# Patient Record
Sex: MALE | Race: WHITE | NOT HISPANIC OR LATINO | Employment: PART TIME | URBAN - METROPOLITAN AREA
[De-identification: names, ages, dates, MRNs, and addresses within clinical notes are randomized per-mention and may not be internally consistent; named-entity substitution may affect disease eponyms.]

---

## 2018-08-01 ENCOUNTER — APPOINTMENT (OUTPATIENT)
Dept: GENERAL RADIOLOGY | Facility: HOSPITAL | Age: 43
End: 2018-08-01

## 2018-08-01 ENCOUNTER — HOSPITAL ENCOUNTER (EMERGENCY)
Facility: HOSPITAL | Age: 43
Discharge: HOME | End: 2018-08-01
Attending: EMERGENCY MEDICINE | Admitting: EMERGENCY MEDICINE

## 2018-08-01 VITALS
SYSTOLIC BLOOD PRESSURE: 172 MMHG | HEIGHT: 67 IN | WEIGHT: 315 LBS | TEMPERATURE: 98.6 F | RESPIRATION RATE: 20 BRPM | DIASTOLIC BLOOD PRESSURE: 95 MMHG | HEART RATE: 64 BPM | BODY MASS INDEX: 49.44 KG/M2 | OXYGEN SATURATION: 99 %

## 2018-08-01 DIAGNOSIS — S63.92XA SPRAIN OF LEFT HAND, INITIAL ENCOUNTER: Primary | ICD-10-CM

## 2018-08-01 PROCEDURE — 73130 X-RAY EXAM OF HAND: CPT

## 2018-08-01 PROCEDURE — 99283 EMERGENCY DEPT VISIT LOW MDM: CPT

## 2018-08-01 PROCEDURE — 99282 EMERGENCY DEPT VISIT SF MDM: CPT | Performed by: PHYSICIAN ASSISTANT

## 2018-08-01 RX ORDER — IBUPROFEN 400 MG/1
800 TABLET ORAL ONCE
Status: COMPLETED | OUTPATIENT
Start: 2018-08-01 | End: 2018-08-01

## 2018-08-01 RX ADMIN — IBUPROFEN 800 MG: 400 TABLET ORAL at 21:35

## 2018-08-02 NOTE — ED PROVIDER NOTES
"Subjective   History of Present Illness  History of Present Illness    Chief complaint: hand injury    Location: L hand    Quality/Severity: \"pain\".  0/10 rest, 2-8 / 10 with movement (varies)    Timing/Duration: last night    Modifying Factors: movement and palp makes worse. Nothing makes better. Has not tried anything    Associated Symptoms: Denies numbness or tingling.  Denies change in sensation.  Denies wrist pain.    Narrative: 43-year-old male presents with a hand injury at work last night.  He states that he was moving some bins when an extra been was there that he was not aware of and his hand slipped and ended up doing \"the splits\" with his fingers.  He denies any other injury.    Review of Systems  General: Denies fevers or chills.  Denies any weakness or fatigue.  Denies any weight loss or weight gain.  SKIN: Denies any rashes lesions or ulcers.  Denies color change.  ENT: Denies sore throat or rhinorrhea.  Denies ear pain.    EYES: Denies any blurred vision.  Denies any change in vision.  Denies any photophobia.  Denies any vision loss.  LUNGS: Denies any shortness of breath or wheezing.  Denies any cough.  Denies any hemoptysis.  CARDIAC: Denies any chest pain.  Denies palpitations.  Denies syncope.  Denies any edema  ABD: Denies any abdominal pain.  Denies any nausea or vomiting or diarrhea.  Denies any rectal bleeding.  Denies constipation  : Denies any dysuria, urgency, frequency or hematuria.  Denies discharge.  Denies flank pain.  NEURO: Denies any focal weakness.  Denies headache.  Denies seizures.  Denies changes in speech or difficulty walking.  ENDOCRINE: Denies polydipsia and polyuria  M/S: as above.  Denies back pain, myalgias or neck pain  HEME/LYMPH: Negative for adenopathy. Does not bruise/bleed easily.   PSYCH: Negative for suicidal ideas. Denies anxiety or depression  review was performed in addition to those in the above all other reviews are negative.      Past Medical History: "   Diagnosis Date   • Hypertension    • Hypoglycemia        No Known Allergies    History reviewed. No pertinent surgical history.    History reviewed. No pertinent family history.    Social History     Social History   • Marital status: Single     Social History Main Topics   • Smoking status: Current Every Day Smoker     Packs/day: 1.00     Types: Cigarettes   • Drug use: Unknown     Other Topics Concern   • Not on file     No current facility-administered medications for this encounter.   No current outpatient prescriptions on file.        Objective   Physical Exam  Vitals:    08/01/18 2106   BP: 172/95   Pulse: 64   Resp: 20   Temp: 98.6 °F (37 °C)   SpO2: 99%       GENERAL: Alert and oriented ×4.  No apparent distress.  SKIN: Warm, pink and dry  HEENT: Atraumatic normocephalic  LUNGS: Clear to auscultation bilaterally without wheezes, rales or rhonchi  CARDIAC: Regular rate and rhythm.  S1 and S2.  No murmurs, rubs or gallops.  M/S: no deformity.  Decreased range of motion with flexion to the left third and fourth digits restricted by pain.  Minimal edema.  Minimal left hand edema.  There is no erythema.  No ecchymosis.  No abrasions.  No wrist tenderness.  No snuffbox tenderness.  PSYCH: Normal mood and affect    Procedures           ED Course    Ice applied.  Motrin given.    Reviewed hand xray. Independently viewed by me. Interpreted by me. Discussed with patient that final radiology read would be done tomorrow by the radiologist. nad    Pt refuses rx.    Ace placed by myself. + PMS post placement.    Pt has been counseled on elevated BP and instructed to f/u with PMD for repeat BP check.  Discussed pertinent labs and imaging findings with the patient/family.  Patient/Family voiced understanding of need to follow-up for recheck, further testing as needed.  Return to the emergency Department warnings were given.    F/u occ med.            MDM  Number of Diagnoses or Management Options  Sprain of left hand,  initial encounter: new and requires workup     Amount and/or Complexity of Data Reviewed  Tests in the radiology section of CPT®: ordered and reviewed  Tests in the medicine section of CPT®: reviewed and ordered  Independent visualization of images, tracings, or specimens: yes    Risk of Complications, Morbidity, and/or Mortality  Presenting problems: low  Diagnostic procedures: low  Management options: low    Patient Progress  Patient progress: improved        Final diagnoses:   Sprain of left hand, initial encounter       Dictated utilizing Dragon dictation       Corie Duarte PA-C  08/01/18 3522

## 2018-08-02 NOTE — ED NOTES
Educated pt on medications, home care, follow-up care, and reasons to return to ER. Patient verbalized understanding. Patient ambulatory from ER.     Nelida Rivera RN  08/01/18 3634

## 2021-07-05 ENCOUNTER — APPOINTMENT (OUTPATIENT)
Dept: GENERAL RADIOLOGY | Facility: HOSPITAL | Age: 46
End: 2021-07-05

## 2021-07-05 ENCOUNTER — HOSPITAL ENCOUNTER (OUTPATIENT)
Facility: HOSPITAL | Age: 46
Setting detail: OBSERVATION
Discharge: HOME | End: 2021-07-08
Attending: EMERGENCY MEDICINE | Admitting: HOSPITALIST

## 2021-07-05 DIAGNOSIS — Z72.0 TOBACCO ABUSE: ICD-10-CM

## 2021-07-05 DIAGNOSIS — E66.01 MORBID OBESITY WITH BMI OF 50.0-59.9, ADULT (HCC): ICD-10-CM

## 2021-07-05 DIAGNOSIS — R07.2 PRECORDIAL PAIN: Primary | ICD-10-CM

## 2021-07-05 DIAGNOSIS — N17.9 ACUTE RENAL FAILURE, UNSPECIFIED ACUTE RENAL FAILURE TYPE (HCC): ICD-10-CM

## 2021-07-05 LAB
ALBUMIN SERPL-MCNC: 4.7 G/DL (ref 3.5–5.2)
ALBUMIN/GLOB SERPL: 1.4 G/DL
ALP SERPL-CCNC: 63 U/L (ref 39–117)
ALT SERPL W P-5'-P-CCNC: 12 U/L (ref 1–41)
ANION GAP SERPL CALCULATED.3IONS-SCNC: 14.5 MMOL/L (ref 5–15)
APTT PPP: 26.8 SECONDS (ref 24.3–38.1)
AST SERPL-CCNC: 15 U/L (ref 1–40)
BASOPHILS # BLD AUTO: 0.03 10*3/MM3 (ref 0–0.2)
BASOPHILS NFR BLD AUTO: 0.3 % (ref 0–1.5)
BILIRUB SERPL-MCNC: 0.8 MG/DL (ref 0–1.2)
BUN SERPL-MCNC: 37 MG/DL (ref 6–20)
BUN/CREAT SERPL: 9.4 (ref 7–25)
CALCIUM SPEC-SCNC: 10.6 MG/DL (ref 8.6–10.5)
CHLORIDE SERPL-SCNC: 99 MMOL/L (ref 98–107)
CO2 SERPL-SCNC: 25.5 MMOL/L (ref 22–29)
CREAT SERPL-MCNC: 3.94 MG/DL (ref 0.76–1.27)
D DIMER PPP FEU-MCNC: 0.36 MCGFEU/ML (ref 0–0.46)
DEPRECATED RDW RBC AUTO: 47.6 FL (ref 37–54)
EOSINOPHIL # BLD AUTO: 0.06 10*3/MM3 (ref 0–0.4)
EOSINOPHIL NFR BLD AUTO: 0.5 % (ref 0.3–6.2)
ERYTHROCYTE [DISTWIDTH] IN BLOOD BY AUTOMATED COUNT: 14.3 % (ref 12.3–15.4)
GFR SERPL CREATININE-BSD FRML MDRD: 17 ML/MIN/1.73
GLOBULIN UR ELPH-MCNC: 3.3 GM/DL
GLUCOSE SERPL-MCNC: 141 MG/DL (ref 65–99)
HCT VFR BLD AUTO: 45.8 % (ref 37.5–51)
HGB BLD-MCNC: 14.5 G/DL (ref 13–17.7)
IMM GRANULOCYTES # BLD AUTO: 0.05 10*3/MM3 (ref 0–0.05)
IMM GRANULOCYTES NFR BLD AUTO: 0.5 % (ref 0–0.5)
INR PPP: 1.11 (ref 0.9–1.1)
LYMPHOCYTES # BLD AUTO: 1.24 10*3/MM3 (ref 0.7–3.1)
LYMPHOCYTES NFR BLD AUTO: 11.2 % (ref 19.6–45.3)
MCH RBC QN AUTO: 28.9 PG (ref 26.6–33)
MCHC RBC AUTO-ENTMCNC: 31.7 G/DL (ref 31.5–35.7)
MCV RBC AUTO: 91.4 FL (ref 79–97)
MONOCYTES # BLD AUTO: 0.86 10*3/MM3 (ref 0.1–0.9)
MONOCYTES NFR BLD AUTO: 7.8 % (ref 5–12)
NEUTROPHILS NFR BLD AUTO: 79.7 % (ref 42.7–76)
NEUTROPHILS NFR BLD AUTO: 8.85 10*3/MM3 (ref 1.7–7)
NRBC BLD AUTO-RTO: 0 /100 WBC (ref 0–0.2)
NT-PROBNP SERPL-MCNC: 268.1 PG/ML (ref 0–450)
PLATELET # BLD AUTO: 332 10*3/MM3 (ref 140–450)
PMV BLD AUTO: 9.8 FL (ref 6–12)
POTASSIUM SERPL-SCNC: 4.1 MMOL/L (ref 3.5–5.2)
PROT SERPL-MCNC: 8 G/DL (ref 6–8.5)
PROTHROMBIN TIME: 14.3 SECONDS (ref 12.1–15)
RBC # BLD AUTO: 5.01 10*6/MM3 (ref 4.14–5.8)
SARS-COV-2 RNA PNL SPEC NAA+PROBE: NOT DETECTED
SODIUM SERPL-SCNC: 139 MMOL/L (ref 136–145)
TROPONIN T SERPL-MCNC: <0.01 NG/ML (ref 0–0.03)
WBC # BLD AUTO: 11.09 10*3/MM3 (ref 3.4–10.8)

## 2021-07-05 PROCEDURE — 93010 ELECTROCARDIOGRAM REPORT: CPT | Performed by: INTERNAL MEDICINE

## 2021-07-05 PROCEDURE — 85730 THROMBOPLASTIN TIME PARTIAL: CPT | Performed by: EMERGENCY MEDICINE

## 2021-07-05 PROCEDURE — 71045 X-RAY EXAM CHEST 1 VIEW: CPT

## 2021-07-05 PROCEDURE — 80053 COMPREHEN METABOLIC PANEL: CPT | Performed by: EMERGENCY MEDICINE

## 2021-07-05 PROCEDURE — G0378 HOSPITAL OBSERVATION PER HR: HCPCS

## 2021-07-05 PROCEDURE — 73610 X-RAY EXAM OF ANKLE: CPT

## 2021-07-05 PROCEDURE — 83880 ASSAY OF NATRIURETIC PEPTIDE: CPT | Performed by: EMERGENCY MEDICINE

## 2021-07-05 PROCEDURE — 99284 EMERGENCY DEPT VISIT MOD MDM: CPT

## 2021-07-05 PROCEDURE — C9803 HOPD COVID-19 SPEC COLLECT: HCPCS

## 2021-07-05 PROCEDURE — 96360 HYDRATION IV INFUSION INIT: CPT

## 2021-07-05 PROCEDURE — 87635 SARS-COV-2 COVID-19 AMP PRB: CPT | Performed by: EMERGENCY MEDICINE

## 2021-07-05 PROCEDURE — 96361 HYDRATE IV INFUSION ADD-ON: CPT

## 2021-07-05 PROCEDURE — 93005 ELECTROCARDIOGRAM TRACING: CPT | Performed by: EMERGENCY MEDICINE

## 2021-07-05 PROCEDURE — 99285 EMERGENCY DEPT VISIT HI MDM: CPT | Performed by: EMERGENCY MEDICINE

## 2021-07-05 PROCEDURE — 85379 FIBRIN DEGRADATION QUANT: CPT | Performed by: EMERGENCY MEDICINE

## 2021-07-05 PROCEDURE — 85025 COMPLETE CBC W/AUTO DIFF WBC: CPT | Performed by: EMERGENCY MEDICINE

## 2021-07-05 PROCEDURE — 85610 PROTHROMBIN TIME: CPT | Performed by: EMERGENCY MEDICINE

## 2021-07-05 PROCEDURE — 94640 AIRWAY INHALATION TREATMENT: CPT

## 2021-07-05 PROCEDURE — 84484 ASSAY OF TROPONIN QUANT: CPT | Performed by: EMERGENCY MEDICINE

## 2021-07-05 RX ORDER — NITROGLYCERIN 0.4 MG/1
0.4 TABLET SUBLINGUAL
Status: DISCONTINUED | OUTPATIENT
Start: 2021-07-05 | End: 2021-07-08 | Stop reason: HOSPADM

## 2021-07-05 RX ORDER — SODIUM CHLORIDE 9 MG/ML
125 INJECTION, SOLUTION INTRAVENOUS CONTINUOUS
Status: DISCONTINUED | OUTPATIENT
Start: 2021-07-05 | End: 2021-07-08

## 2021-07-05 RX ORDER — ALBUTEROL SULFATE 2.5 MG/3ML
2.5 SOLUTION RESPIRATORY (INHALATION) ONCE
Status: COMPLETED | OUTPATIENT
Start: 2021-07-05 | End: 2021-07-05

## 2021-07-05 RX ORDER — SODIUM CHLORIDE 0.9 % (FLUSH) 0.9 %
10 SYRINGE (ML) INJECTION AS NEEDED
Status: DISCONTINUED | OUTPATIENT
Start: 2021-07-05 | End: 2021-07-08 | Stop reason: HOSPADM

## 2021-07-05 RX ORDER — ASPIRIN 81 MG/1
324 TABLET, CHEWABLE ORAL ONCE
Status: COMPLETED | OUTPATIENT
Start: 2021-07-05 | End: 2021-07-05

## 2021-07-05 RX ORDER — LISINOPRIL AND HYDROCHLOROTHIAZIDE 25; 20 MG/1; MG/1
1 TABLET ORAL DAILY
COMMUNITY
Start: 2021-05-26 | End: 2021-07-08 | Stop reason: HOSPADM

## 2021-07-05 RX ADMIN — SODIUM CHLORIDE 500 ML: 9 INJECTION, SOLUTION INTRAVENOUS at 19:07

## 2021-07-05 RX ADMIN — ASPIRIN 81 MG CHEWABLE TABLET 324 MG: 81 TABLET CHEWABLE at 17:50

## 2021-07-05 RX ADMIN — SODIUM CHLORIDE 125 ML/HR: 9 INJECTION, SOLUTION INTRAVENOUS at 19:45

## 2021-07-05 RX ADMIN — ALBUTEROL SULFATE 2.5 MG: 2.5 SOLUTION RESPIRATORY (INHALATION) at 18:15

## 2021-07-05 RX ADMIN — NITROGLYCERIN 0.4 MG: 0.4 TABLET SUBLINGUAL at 17:53

## 2021-07-05 RX ADMIN — SODIUM CHLORIDE 125 ML/HR: 9 INJECTION, SOLUTION INTRAVENOUS at 23:04

## 2021-07-05 NOTE — ED PROVIDER NOTES
"Subjective   Don Guajardo is a 46-year-old white male who presents secondary to chest pain.  Onset yesterday of left-sided chest pain.  Described as sharp.  The pain has been fairly constant although patient states he was able to go to sleep last night.  He was awakened several times during the night with pain.  Associated shortness of breath.  Approximate 1:30 PM patient felt tightness in his chest while on a break at work.  Patient's pain significantly worsened at that time.  He rates it at a 9.5 at its worst.  Pain is currently a 7.  Patient also reports that his \"vision narrowed\" at that time.  Patient presents for evaluation.    Cardiac risk factors include hypertension, morbid obesity and tobacco abuse.  Patient smokes cigarettes and small \"cigarette cigars\".      History provided by:  Patient      Review of Systems   Constitutional: Negative for fever.   HENT: Negative for rhinorrhea.    Eyes: Positive for visual disturbance. Negative for redness.   Respiratory: Positive for shortness of breath. Negative for cough.    Cardiovascular: Positive for chest pain.   Gastrointestinal: Negative for abdominal pain.   Genitourinary: Negative for dysuria.   Musculoskeletal: Negative for back pain.   Skin: Negative for rash.   Neurological: Negative for syncope.   All other systems reviewed and are negative.      Past Medical History:   Diagnosis Date   • Hypertension    • Hypoglycemia        No Known Allergies    History reviewed. No pertinent surgical history.    History reviewed. No pertinent family history.    Social History     Socioeconomic History   • Marital status: Single     Spouse name: Not on file   • Number of children: Not on file   • Years of education: Not on file   • Highest education level: Not on file   Tobacco Use   • Smoking status: Current Every Day Smoker     Packs/day: 2.00     Types: Cigarettes   • Smokeless tobacco: Never Used   Substance and Sexual Activity   • Alcohol use: Not Currently     " Comment: occasionally   • Drug use: Never           Objective   Physical Exam  Vitals and nursing note reviewed.   Constitutional:       General: He is in acute distress (Secondary to discomfort and anxiety.).      Appearance: Normal appearance. He is well-developed. He is not ill-appearing, toxic-appearing or diaphoretic.      Comments: 46-year-old white male laying in bed.  Patient is morbidly obese.  He appears in fair overall health.  Patient obviously anxious.  Tearful at times during history and physical.  Hyperventilating.  Vital signs notable for respiratory rate of 32.  Otherwise unremarkable.   HENT:      Head: Normocephalic and atraumatic.      Right Ear: Tympanic membrane, ear canal and external ear normal.      Left Ear: Tympanic membrane, ear canal and external ear normal.      Nose: Nose normal.      Mouth/Throat:      Mouth: Mucous membranes are moist.      Pharynx: Oropharynx is clear.   Eyes:      Extraocular Movements: Extraocular movements intact.      Conjunctiva/sclera: Conjunctivae normal.      Pupils: Pupils are equal, round, and reactive to light.   Cardiovascular:      Rate and Rhythm: Normal rate and regular rhythm.      Heart sounds: Normal heart sounds. No murmur heard.   No friction rub. No gallop.    Pulmonary:      Effort: Pulmonary effort is normal. Tachypnea present. No respiratory distress.      Breath sounds: No stridor. Wheezing (Slight expiratory wheeze heard in all lung fields.) present. No rhonchi or rales.   Abdominal:      General: There is no distension.      Palpations: Abdomen is soft.      Tenderness: There is no abdominal tenderness.   Musculoskeletal:         General: Normal range of motion.      Cervical back: Normal range of motion and neck supple.   Skin:     General: Skin is warm and dry.      Findings: No erythema or rash.   Neurological:      General: No focal deficit present.      Mental Status: He is alert and oriented to person, place, and time.      Cranial  Nerves: No cranial nerve deficit.      Deep Tendon Reflexes: Reflexes are normal and symmetric.   Psychiatric:         Mood and Affect: Mood is anxious.         Behavior: Behavior normal.         Procedures       EKG 12-lead  Date 7/5/2021  Time 17: 11  Normal sinus rhythm  Normal rate  Normal axis  Normal intervals  Q waves seen in inferior leads.  Not clinically significant.  Slight ST depression in inferior leads  Nonspecific T wave changes  Abnormal EKG    No old EKG for comparison.    ED Course  ED Course as of Jul 06 0020   Mon Jul 05, 2021   1725 Patient appears very anxious.  Hyperventilating.  EKG shows slight ST depression in inferior leads.  Small Q waves which are less than 1/3rd the height of the QRS complex.  Patient has multiple cardiac risk factors.  Giving aspirin and nitroglycerin.    [SS]   1813 Chest pain dropped from 7-4 with 1 nitroglycerin.  Unfortunately patient's blood pressure also dropped.  Will give a bolus of IV fluids.  With holding nitroglycerin at this time.    [SS]   1814 WBC(!): 11.09 [SS]   1814 Glucose(!): 141 [SS]   1814 BUN(!): 37 [SS]   1815 Creatinine(!): 3.94 [SS]   1815 CBC shows minimal leukocytosis with white count 11.09K.  Otherwise unremarkable.  CMP shows hyperglycemia glucose 141.  Acute renal failure with a BUN of 37 and creatinine 3.94.  Remainder of electrolytes are unremarkable including potassium which is 4.1.  Troponin, D-dimer and proBNP are all unremarkable.  Awaiting chest x-ray.  IV fluid bolus just ordered to secondary to decreased blood pressure after nitroglycerin.  Will start continuous infusion of IV fluids after bolus.    [SS]   1955 Discussed at length with patient all results, diagnoses, indications for hospitalization.  Patient reports he is chest pain-free except for deep inspiration.  Patient has no history of renal disease nor does anyone in his family.  As patient had normal renal function 1 month ago I suspect this is secondary to  dehydration.Discussed with Dr. Vahid Warner.  He will hospitalize for further care.  Consulting cardiology.    [SS]   1957 Discussed with Dr. Reuben Colvin.  She will evaluate patient in the morning and determine additional work-up at that time.    [SS]      ED Course User Index  [SS] Aden Dyson MD      Labs Reviewed   COMPREHENSIVE METABOLIC PANEL - Abnormal; Notable for the following components:       Result Value    Glucose 141 (*)     BUN 37 (*)     Creatinine 3.94 (*)     Calcium 10.6 (*)     eGFR Non  Amer 17 (*)     All other components within normal limits    Narrative:     GFR Normal >60  Chronic Kidney Disease <60  Kidney Failure <15     PROTIME-INR - Abnormal; Notable for the following components:    INR 1.11 (*)     All other components within normal limits    Narrative:     Therapeutic Ranges for INR: 2.0-3.0 (PT 20-30)                              2.5-3.5 (PT 25-34)   CBC WITH AUTO DIFFERENTIAL - Abnormal; Notable for the following components:    WBC 11.09 (*)     Neutrophil % 79.7 (*)     Lymphocyte % 11.2 (*)     Neutrophils, Absolute 8.85 (*)     All other components within normal limits   COVID-19,[facility] IN-HOUSE,[facility] SW[facility] NO TRANSPORT MEDIA 2 HR TAT - Normal    Narrative:     Fact sheet for providers: https://www.fda.gov/media/531699/download     Fact sheet for patients: https://www.fda.gov/media/554773/download    Test performed by PCR.    Consider negative results in combination with clinical observations, patient history, and epidemiological information.   APTT - Normal    Narrative:     PTT = The equivalent PTT values for the therapeutic range of heparin levels at 0.1 to 0.7 U/ml are 53 to 110 seconds.     BNP (IN-HOUSE) - Normal    Narrative:     Among patients with dyspnea, NT-proBNP is highly sensitive for the detection of acute congestive heart failure. In addition NT-proBNP of <300 pg/ml effectively rules out acute congestive heart failure with 99% negative predictive  value.    Results may be falsely decreased if patient taking Biotin.     D-DIMER, QUANTITATIVE - Normal    Narrative:     Can be elevated in, but is not diagnostic for deep vein thrombosis (DVT) or pulmonary embolis (PE).  It is also elevated in other medical conditions.  Clinical correlation is required.  The negative cut-off value for the D-Dimer is 0.50 mcg FEU/mL for DVT and PE.     TROPONIN (IN-HOUSE) - Normal    Narrative:     Troponin T Reference Range:  <= 0.03 ng/mL-   Negative for AMI  >0.03 ng/mL-     Abnormal for myocardial necrosis.  Clinicians would have to utilize clinical acumen, EKG, Troponin and serial changes to determine if it is an Acute Myocardial Infarction or myocardial injury due to an underlying chronic condition.       Results may be falsely decreased if patient taking Biotin.     TROPONIN (IN-HOUSE)   COMPREHENSIVE METABOLIC PANEL   CBC WITH AUTO DIFFERENTIAL   CBC AND DIFFERENTIAL    Narrative:     The following orders were created for panel order CBC & Differential.  Procedure                               Abnormality         Status                     ---------                               -----------         ------                     CBC Auto Differential[[id number]]         Abnormal            Final result                 Please view results for these tests on the individual orders.   CBC AND DIFFERENTIAL    Narrative:     The following orders were created for panel order CBC & Differential.  Procedure                               Abnormality         Status                     ---------                               -----------         ------                     CBC Auto Differential[[id number]]                                                         Please view results for these tests on the individual orders.     XR Ankle 3+ View Right    Result Date: 7/5/2021  Narrative: CR Ankle Min 3 Vws RT INDICATION: Chronic ankle pain COMPARISON: None. FINDINGS: 3 view(s) of the right ankle.   No fracture or dislocation. No bone erosion or destruction. There is degenerative change with a plantar spur. There also appears to be osseous abnormality in region of the posterior process of the talus that appears chronic and may be contributing to posterior impingement of the ankle. Degenerative changes are seen involving the midfoot. No foreign body.     Impression: No acute fracture or subluxation. No acute findings. There is degenerative change. Signer Name: Geena Calzada MD  Signed: 7/5/2021 6:43 PM  Workstation Name: [id number]  [facility]    XR Chest 1 View    Result Date: 7/5/2021  Narrative: CR Chest 1 Vw INDICATION: Chest pain and shortness of breath COMPARISON:  None available. FINDINGS: Single portable AP view(s) of the chest. Limited exam. The heart and mediastinal contours are normal. The lungs are clear. No pneumothorax or pleural effusion.     Impression: No definite acute cardiopulmonary findings. Signer Name: Geena Calzada MD  Signed: 7/5/2021 6:45 PM  Workstation Name: [id number]  [facility]    My differential diagnosis for chest pain includes but is not limited to:  Muscle strain, costochondritis, myositis, pleurisy, rib fracture, intercostal neuritis, herpes zoster, tumor, myocardial infarction, coronary syndrome, unstable angina, angina, aortic dissection, mitral valve prolapse, pericarditis, palpitations, pulmonary embolus, pneumonia, pneumothorax, lung cancer, GERD, esophagitis, esophageal spasm                                       MDM    Final diagnoses:   Precordial pain   Acute renal failure, unspecified acute renal failure type (CMS/[facility])   Tobacco abuse   Morbid obesity with BMI of 50.0-59.9, adult (CMS/[facility])       ED Disposition  ED Disposition     ED Disposition Condition Comment    Decision to Admit  Level of Care: Telemetry [5]   Admitting Physician: MIREYA ACHARYA [[id number]]   Attending Physician: MIREYA ACHARYA [[id number]]    Patient Class: Observation [104]            No follow-up provider specified.       Medication List      No changes were made to your prescriptions during this visit.          Aden Dyson MD  07/06/21 0020

## 2021-07-05 NOTE — ED NOTES
"Pt states he is currently chest pain free. He does c/o, \"a small pain\" when taking a deep breath but states it is not every time that he takes a deep breath.      Reshma Joshi RN  07/05/21 1936    "

## 2021-07-05 NOTE — ED NOTES
Pt states he is feeling better/ now slightly hypotensive, to hold ntg     Leonard Sutton, RN  07/05/21 2806

## 2021-07-06 ENCOUNTER — APPOINTMENT (OUTPATIENT)
Dept: NUCLEAR MEDICINE | Facility: HOSPITAL | Age: 46
End: 2021-07-06

## 2021-07-06 ENCOUNTER — APPOINTMENT (OUTPATIENT)
Dept: CARDIOLOGY | Facility: HOSPITAL | Age: 46
End: 2021-07-06

## 2021-07-06 LAB
ALBUMIN SERPL-MCNC: 3.9 G/DL (ref 3.5–5.2)
ALBUMIN/GLOB SERPL: 1.4 G/DL
ALP SERPL-CCNC: 53 U/L (ref 39–117)
ALT SERPL W P-5'-P-CCNC: 13 U/L (ref 1–41)
ANION GAP SERPL CALCULATED.3IONS-SCNC: 10.5 MMOL/L (ref 5–15)
AST SERPL-CCNC: 19 U/L (ref 1–40)
BASOPHILS # BLD AUTO: 0.03 10*3/MM3 (ref 0–0.2)
BASOPHILS NFR BLD AUTO: 0.4 % (ref 0–1.5)
BH CV ECHO MEAS - AO MAX PG: 8 MMHG
BH CV ECHO MEAS - AO MEAN PG (FULL): 2 MMHG
BH CV ECHO MEAS - AO MEAN PG: 5 MMHG
BH CV ECHO MEAS - AO V2 MAX: 145 CM/SEC
BH CV ECHO MEAS - AO V2 MEAN: 100 CM/SEC
BH CV ECHO MEAS - AO V2 VTI: 31.2 CM
BH CV ECHO MEAS - ASC AORTA: 3.2 CM
BH CV ECHO MEAS - AVA(I,A): 2.5 CM^2
BH CV ECHO MEAS - AVA(I,D): 2.5 CM^2
BH CV ECHO MEAS - BSA(HAYCOCK): 2.8 M^2
BH CV ECHO MEAS - BSA: 2.6 M^2
BH CV ECHO MEAS - BZI_BMI: 55.4 KILOGRAMS/M^2
BH CV ECHO MEAS - BZI_METRIC_HEIGHT: 170 CM
BH CV ECHO MEAS - BZI_METRIC_WEIGHT: 160 KG
BH CV ECHO MEAS - EDV(CUBED): 99.9 ML
BH CV ECHO MEAS - EDV(MOD-SP2): 103 ML
BH CV ECHO MEAS - EDV(MOD-SP4): 67.2 ML
BH CV ECHO MEAS - EDV(TEICH): 99.3 ML
BH CV ECHO MEAS - EF(CUBED): 63 %
BH CV ECHO MEAS - EF(MOD-SP2): 55.1 %
BH CV ECHO MEAS - EF(MOD-SP4): 55.7 %
BH CV ECHO MEAS - EF(TEICH): 54.6 %
BH CV ECHO MEAS - ESV(CUBED): 36.9 ML
BH CV ECHO MEAS - ESV(MOD-SP2): 46.2 ML
BH CV ECHO MEAS - ESV(MOD-SP4): 29.8 ML
BH CV ECHO MEAS - ESV(TEICH): 45.1 ML
BH CV ECHO MEAS - FS: 28.2 %
BH CV ECHO MEAS - IVS/LVPW: 1.1
BH CV ECHO MEAS - IVSD: 1 CM
BH CV ECHO MEAS - LAT PEAK E' VEL: 12.9 CM/SEC
BH CV ECHO MEAS - LV DIASTOLIC VOL/BSA (35-75): 26.1 ML/M^2
BH CV ECHO MEAS - LV MASS(C)D: 148.2 GRAMS
BH CV ECHO MEAS - LV MASS(C)DI: 57.6 GRAMS/M^2
BH CV ECHO MEAS - LV MAX PG: 5.6 MMHG
BH CV ECHO MEAS - LV MEAN PG: 3 MMHG
BH CV ECHO MEAS - LV SYSTOLIC VOL/BSA (12-30): 11.6 ML/M^2
BH CV ECHO MEAS - LV V1 MAX: 118 CM/SEC
BH CV ECHO MEAS - LV V1 MEAN: 78.1 CM/SEC
BH CV ECHO MEAS - LV V1 VTI: 22.3 CM
BH CV ECHO MEAS - LVIDD: 4.6 CM
BH CV ECHO MEAS - LVIDS: 3.3 CM
BH CV ECHO MEAS - LVLD AP2: 9 CM
BH CV ECHO MEAS - LVLD AP4: 7 CM
BH CV ECHO MEAS - LVLS AP2: 7.9 CM
BH CV ECHO MEAS - LVLS AP4: 6 CM
BH CV ECHO MEAS - LVOT AREA (M): 3.5 CM^2
BH CV ECHO MEAS - LVOT AREA: 3.5 CM^2
BH CV ECHO MEAS - LVOT DIAM: 2.1 CM
BH CV ECHO MEAS - LVPWD: 0.88 CM
BH CV ECHO MEAS - MED PEAK E' VEL: 11.1 CM/SEC
BH CV ECHO MEAS - MV A DUR: 203 SEC
BH CV ECHO MEAS - MV A MAX VEL: 59.2 CM/SEC
BH CV ECHO MEAS - MV DEC SLOPE: 289 CM/SEC^2
BH CV ECHO MEAS - MV DEC TIME: 185 SEC
BH CV ECHO MEAS - MV E MAX VEL: 100 CM/SEC
BH CV ECHO MEAS - MV E/A: 1.7
BH CV ECHO MEAS - MV MEAN PG: 2 MMHG
BH CV ECHO MEAS - MV P1/2T MAX VEL: 98.4 CM/SEC
BH CV ECHO MEAS - MV P1/2T: 99.7 MSEC
BH CV ECHO MEAS - MV V2 MEAN: 58.1 CM/SEC
BH CV ECHO MEAS - MV V2 VTI: 34.5 CM
BH CV ECHO MEAS - MVA P1/2T LCG: 2.2 CM^2
BH CV ECHO MEAS - MVA(P1/2T): 2.2 CM^2
BH CV ECHO MEAS - MVA(VTI): 2.2 CM^2
BH CV ECHO MEAS - PA ACC TIME: 0.14 SEC
BH CV ECHO MEAS - PA MAX PG: 4.1 MMHG
BH CV ECHO MEAS - PA PR(ACCEL): 16 MMHG
BH CV ECHO MEAS - PA V2 MAX: 101 CM/SEC
BH CV ECHO MEAS - RV MEAN PG: 1 MMHG
BH CV ECHO MEAS - RV V1 MEAN: 43 CM/SEC
BH CV ECHO MEAS - RV V1 VTI: 16.1 CM
BH CV ECHO MEAS - SI(CUBED): 24.5 ML/M^2
BH CV ECHO MEAS - SI(LVOT): 30 ML/M^2
BH CV ECHO MEAS - SI(MOD-SP2): 22.1 ML/M^2
BH CV ECHO MEAS - SI(MOD-SP4): 14.5 ML/M^2
BH CV ECHO MEAS - SI(TEICH): 21.1 ML/M^2
BH CV ECHO MEAS - SV(CUBED): 63 ML
BH CV ECHO MEAS - SV(LVOT): 77.2 ML
BH CV ECHO MEAS - SV(MOD-SP2): 56.8 ML
BH CV ECHO MEAS - SV(MOD-SP4): 37.4 ML
BH CV ECHO MEAS - SV(TEICH): 54.2 ML
BH CV ECHO MEASUREMENTS AVERAGE E/E' RATIO: 8.33
BILIRUB SERPL-MCNC: 0.5 MG/DL (ref 0–1.2)
BUN SERPL-MCNC: 45 MG/DL (ref 6–20)
BUN/CREAT SERPL: 13.5 (ref 7–25)
CALCIUM SPEC-SCNC: 9.7 MG/DL (ref 8.6–10.5)
CHLORIDE SERPL-SCNC: 103 MMOL/L (ref 98–107)
CO2 SERPL-SCNC: 27.5 MMOL/L (ref 22–29)
CREAT SERPL-MCNC: 3.33 MG/DL (ref 0.76–1.27)
DEPRECATED RDW RBC AUTO: 50 FL (ref 37–54)
EOSINOPHIL # BLD AUTO: 0.13 10*3/MM3 (ref 0–0.4)
EOSINOPHIL NFR BLD AUTO: 1.6 % (ref 0.3–6.2)
ERYTHROCYTE [DISTWIDTH] IN BLOOD BY AUTOMATED COUNT: 14.4 % (ref 12.3–15.4)
GFR SERPL CREATININE-BSD FRML MDRD: 20 ML/MIN/1.73
GLOBULIN UR ELPH-MCNC: 2.7 GM/DL
GLUCOSE SERPL-MCNC: 116 MG/DL (ref 65–99)
HCT VFR BLD AUTO: 40.9 % (ref 37.5–51)
HGB BLD-MCNC: 12.6 G/DL (ref 13–17.7)
IMM GRANULOCYTES # BLD AUTO: 0.04 10*3/MM3 (ref 0–0.05)
IMM GRANULOCYTES NFR BLD AUTO: 0.5 % (ref 0–0.5)
LV EF 2D ECHO EST: 55 %
LYMPHOCYTES # BLD AUTO: 2.01 10*3/MM3 (ref 0.7–3.1)
LYMPHOCYTES NFR BLD AUTO: 25.4 % (ref 19.6–45.3)
MAXIMAL PREDICTED HEART RATE: 176 BPM
MCH RBC QN AUTO: 29.4 PG (ref 26.6–33)
MCHC RBC AUTO-ENTMCNC: 30.8 G/DL (ref 31.5–35.7)
MCV RBC AUTO: 95.3 FL (ref 79–97)
MONOCYTES # BLD AUTO: 0.82 10*3/MM3 (ref 0.1–0.9)
MONOCYTES NFR BLD AUTO: 10.4 % (ref 5–12)
NEUTROPHILS NFR BLD AUTO: 4.88 10*3/MM3 (ref 1.7–7)
NEUTROPHILS NFR BLD AUTO: 61.7 % (ref 42.7–76)
NRBC BLD AUTO-RTO: 0 /100 WBC (ref 0–0.2)
PLATELET # BLD AUTO: 254 10*3/MM3 (ref 140–450)
PMV BLD AUTO: 10.1 FL (ref 6–12)
POTASSIUM SERPL-SCNC: 3.9 MMOL/L (ref 3.5–5.2)
PROT SERPL-MCNC: 6.6 G/DL (ref 6–8.5)
QT INTERVAL: 351 MS
QT INTERVAL: 411 MS
RBC # BLD AUTO: 4.29 10*6/MM3 (ref 4.14–5.8)
SODIUM SERPL-SCNC: 141 MMOL/L (ref 136–145)
STRESS TARGET HR: 150 BPM
TROPONIN T SERPL-MCNC: <0.01 NG/ML (ref 0–0.03)
WBC # BLD AUTO: 7.91 10*3/MM3 (ref 3.4–10.8)

## 2021-07-06 PROCEDURE — 84484 ASSAY OF TROPONIN QUANT: CPT | Performed by: INTERNAL MEDICINE

## 2021-07-06 PROCEDURE — 85025 COMPLETE CBC W/AUTO DIFF WBC: CPT | Performed by: INTERNAL MEDICINE

## 2021-07-06 PROCEDURE — 80050 GENERAL HEALTH PANEL: CPT | Performed by: INTERNAL MEDICINE

## 2021-07-06 PROCEDURE — 93016 CV STRESS TEST SUPVJ ONLY: CPT | Performed by: INTERNAL MEDICINE

## 2021-07-06 PROCEDURE — 96361 HYDRATE IV INFUSION ADD-ON: CPT

## 2021-07-06 PROCEDURE — 93306 TTE W/DOPPLER COMPLETE: CPT | Performed by: INTERNAL MEDICINE

## 2021-07-06 PROCEDURE — A9500 TC99M SESTAMIBI: HCPCS | Performed by: INTERNAL MEDICINE

## 2021-07-06 PROCEDURE — 99204 OFFICE O/P NEW MOD 45 MIN: CPT | Performed by: INTERNAL MEDICINE

## 2021-07-06 PROCEDURE — 93306 TTE W/DOPPLER COMPLETE: CPT

## 2021-07-06 PROCEDURE — G0378 HOSPITAL OBSERVATION PER HR: HCPCS

## 2021-07-06 PROCEDURE — 80053 COMPREHEN METABOLIC PANEL: CPT | Performed by: INTERNAL MEDICINE

## 2021-07-06 PROCEDURE — 93010 ELECTROCARDIOGRAM REPORT: CPT | Performed by: INTERNAL MEDICINE

## 2021-07-06 PROCEDURE — 99220 PR INITIAL OBSERVATION CARE/DAY 70 MINUTES: CPT | Performed by: INTERNAL MEDICINE

## 2021-07-06 PROCEDURE — 0 TECHNETIUM SESTAMIBI: Performed by: INTERNAL MEDICINE

## 2021-07-06 PROCEDURE — 25010000002 PERFLUTREN (DEFINITY) 8.476 MG IN SODIUM CHLORIDE (PF) 0.9 % 10 ML INJECTION: Performed by: INTERNAL MEDICINE

## 2021-07-06 PROCEDURE — 78452 HT MUSCLE IMAGE SPECT MULT: CPT

## 2021-07-06 PROCEDURE — 25010000002 REGADENOSON 0.4 MG/5ML SOLUTION: Performed by: INTERNAL MEDICINE

## 2021-07-06 PROCEDURE — 93018 CV STRESS TEST I&R ONLY: CPT | Performed by: INTERNAL MEDICINE

## 2021-07-06 PROCEDURE — 93005 ELECTROCARDIOGRAM TRACING: CPT | Performed by: INTERNAL MEDICINE

## 2021-07-06 PROCEDURE — 83036 HEMOGLOBIN GLYCOSYLATED A1C: CPT | Performed by: NURSE PRACTITIONER

## 2021-07-06 PROCEDURE — 78452 HT MUSCLE IMAGE SPECT MULT: CPT | Performed by: INTERNAL MEDICINE

## 2021-07-06 PROCEDURE — 93017 CV STRESS TEST TRACING ONLY: CPT

## 2021-07-06 RX ADMIN — TECHNETIUM TC 99M SESTAMIBI 1 DOSE: 1 INJECTION INTRAVENOUS at 12:42

## 2021-07-06 RX ADMIN — SODIUM CHLORIDE 125 ML/HR: 9 INJECTION, SOLUTION INTRAVENOUS at 20:22

## 2021-07-06 RX ADMIN — REGADENOSON 0.4 MG: 0.08 INJECTION, SOLUTION INTRAVENOUS at 12:42

## 2021-07-06 RX ADMIN — SODIUM CHLORIDE 2 ML: 9 INJECTION INTRAMUSCULAR; INTRAVENOUS; SUBCUTANEOUS at 08:33

## 2021-07-06 RX ADMIN — SODIUM CHLORIDE 125 ML/HR: 9 INJECTION, SOLUTION INTRAVENOUS at 06:23

## 2021-07-06 NOTE — H&P
Hospitalist Team H&P      Patient Care Team:  Deana Mcconnell MD as PCP - General (Internal Medicine)    CHIEF COMPLAINT: Chest pain    HISTORY OF PRESENT ILLNESS:    This 46-year-old gentleman, morbidly obese, who presented with atypical left-sided chest pain fairly constant, worsens with deep breathing, folic his chest was tight at work, presented to the emergency department ultimately with risk factors nonresolving intermittent chest pain, he was admitted for observation.  Chest pain going on for few days waxing and waning, no history of any abdominal pain or flank pain, no history of any UTIs or recent infections.  Troponins unremarkable there was findings of acute kidney injury with creatinine greater than 3.  He is an everyday smoker of up to 2 packs/day, minimal alcohol, he takes HCTZ lisinopril for hypertension only.  The labs and electrolytes appear to be okay other than BUN/creatinine.  LFTs appear to be okay, no sign of infection with normal white count not anemic.  No other complaints, no fever chills sweats nausea vomiting or diarrhea.    Review of systems otherwise negative x14 point review of systems except as mentioned above  Historical data copied forward from previous encounters in EMR is unchanged      Past Medical History:   Diagnosis Date   • Hypertension    • Hypoglycemia      History reviewed. No pertinent surgical history.  History reviewed. No pertinent family history.  Social History     Tobacco Use   • Smoking status: Current Every Day Smoker     Packs/day: 2.00     Types: Cigarettes   • Smokeless tobacco: Never Used   Substance Use Topics   • Alcohol use: Not Currently     Comment: occasionally   • Drug use: Never     Medications Prior to Admission   Medication Sig Dispense Refill Last Dose   • lisinopril-hydrochlorothiazide (PRINZIDE,ZESTORETIC) 20-25 MG per tablet Take 1 tablet by mouth Daily.   7/5/2021 at 1000     Allergies:  Patient has no known allergies.    REVIEW OF  "SYSTEMS:  Please see the above history of present illness for pertinent positives and negatives.  The remainder of the patient's systems have been reviewed and are negative.  Vital Signs  Temp:  [98.3 °F (36.8 °C)-98.7 °F (37.1 °C)] 98.7 °F (37.1 °C)  Heart Rate:  [67-95] 67  Resp:  [20-32] 20  BP: ()/(41-81) 105/63    Flowsheet Rows      First Filed Value   Admission Height  170.2 cm (67\") Documented at 07/05/2021 1711   Admission Weight  (!) 157 kg (347 lb) Documented at 07/05/2021 1711           Physical Exam:  Physical Exam   Constitutional: Patient appears well-developed and well-nourished and in no acute distress   HEENT:   Head: Normocephalic and atraumatic.   Eyes:  Pupils are equal, round, and reactive to light. EOM are intact. Sclerae are anicteric and noninjected.  Mouth and Throat: Patient has moist mucous membranes. Oropharynx is clear of any erythema or exudate.     Neck: Neck supple. No JVD present. No thyromegaly present. No lymphadenopathy present.  Cardiovascular: Regular rate, regular rhythm, S1 normal and S2 normal.  Exam reveals no gallop and no friction rub.  No murmur heard.  Pulmonary/Chest: Lungs are clear to auscultation bilaterally. No respiratory distress. No wheezes. No rhonchi. No rales.   Abdominal: Morbidly obese soft. Bowel sounds are normal. No distension and no mass. There is no hepatosplenomegaly. There is no tenderness.   Musculoskeletal: Normal muscle tone  Extremities: No edema. Pulses are palpable in all 4 extremities.  Neurological: Patient is alert and oriented to person, place, and time. Cranial nerves II-XII are grossly intact with no focal deficits.  Skin: Skin is warm. No rash noted. Nails show no clubbing.  No cyanosis or erythema.     Results Review:    I reviewed the patient's new clinical results.  Lab Results (most recent)     Procedure Component Value Units Date/Time    Comprehensive Metabolic Panel [[id number]]  (Abnormal) Collected: 07/06/21 0424    " Specimen: Blood Updated: 07/06/21 0521     Glucose 116 mg/dL      BUN 45 mg/dL      Creatinine 3.33 mg/dL      Sodium 141 mmol/L      Potassium 3.9 mmol/L      Chloride 103 mmol/L      CO2 27.5 mmol/L      Calcium 9.7 mg/dL      Total Protein 6.6 g/dL      Albumin 3.90 g/dL      ALT (SGPT) 13 U/L      AST (SGOT) 19 U/L      Alkaline Phosphatase 53 U/L      Total Bilirubin 0.5 mg/dL      eGFR Non African Amer 20 mL/min/1.73      Globulin 2.7 gm/dL      A/G Ratio 1.4 g/dL      BUN/Creatinine Ratio 13.5     Anion Gap 10.5 mmol/L     Narrative:      GFR Normal >60  Chronic Kidney Disease <60  Kidney Failure <15      Troponin [[id number]]  (Normal) Collected: 07/06/21 0424    Specimen: Blood Updated: 07/06/21 0521     Troponin T <0.010 ng/mL     Narrative:      Troponin T Reference Range:  <= 0.03 ng/mL-   Negative for AMI  >0.03 ng/mL-     Abnormal for myocardial necrosis.  Clinicians would have to utilize clinical acumen, EKG, Troponin and serial changes to determine if it is an Acute Myocardial Infarction or myocardial injury due to an underlying chronic condition.       Results may be falsely decreased if patient taking Biotin.      CBC & Differential [[id number]]  (Abnormal) Collected: 07/06/21 0424    Specimen: Blood Updated: 07/06/21 0449    Narrative:      The following orders were created for panel order CBC & Differential.  Procedure                               Abnormality         Status                     ---------                               -----------         ------                     CBC Auto Differential[[id number]]        Abnormal            Final result                 Please view results for these tests on the individual orders.    CBC Auto Differential [[id number]]  (Abnormal) Collected: 07/06/21 0424    Specimen: Blood Updated: 07/06/21 0449     WBC 7.91 10*3/mm3      RBC 4.29 10*6/mm3      Hemoglobin 12.6 g/dL      Hematocrit 40.9 %      MCV 95.3 fL      MCH 29.4 pg      MCHC 30.8 g/dL      RDW  14.4 %      RDW-SD 50.0 fl      MPV 10.1 fL      Platelets 254 10*3/mm3      Neutrophil % 61.7 %      Lymphocyte % 25.4 %      Monocyte % 10.4 %      Eosinophil % 1.6 %      Basophil % 0.4 %      Immature Grans % 0.5 %      Neutrophils, Absolute 4.88 10*3/mm3      Lymphocytes, Absolute 2.01 10*3/mm3      Monocytes, Absolute 0.82 10*3/mm3      Eosinophils, Absolute 0.13 10*3/mm3      Basophils, Absolute 0.03 10*3/mm3      Immature Grans, Absolute 0.04 10*3/mm3      nRBC 0.0 /100 WBC     COVID-19,[facility] IN-HOUSE,Nasal Swab No Transport Media 3-4 HR TAT - Swab, Nasal Cavity [[id number]]  (Normal) Collected: 07/05/21 1957    Specimen: Swab from Nasal Cavity Updated: 07/05/21 2032     COVID19 Not Detected    Narrative:      Fact sheet for providers: https://www.fda.gov/media/539687/download     Fact sheet for patients: https://www.fda.gov/media/413838/download    Test performed by PCR.    Consider negative results in combination with clinical observations, patient history, and epidemiological information.    BNP [[id number]]  (Normal) Collected: 07/05/21 1732    Specimen: Blood Updated: 07/05/21 1807     proBNP 268.1 pg/mL     Narrative:      Among patients with dyspnea, NT-proBNP is highly sensitive for the detection of acute congestive heart failure. In addition NT-proBNP of <300 pg/ml effectively rules out acute congestive heart failure with 99% negative predictive value.    Results may be falsely decreased if patient taking Biotin.      Troponin [[id number]]  (Normal) Collected: 07/05/21 1732    Specimen: Blood Updated: 07/05/21 1802     Troponin T <0.010 ng/mL     Narrative:      Troponin T Reference Range:  <= 0.03 ng/mL-   Negative for AMI  >0.03 ng/mL-     Abnormal for myocardial necrosis.  Clinicians would have to utilize clinical acumen, EKG, Troponin and serial changes to determine if it is an Acute Myocardial Infarction or myocardial injury due to an underlying chronic condition.       Results may be falsely  decreased if patient taking Biotin.      Comprehensive Metabolic Panel [[id number]]  (Abnormal) Collected: 07/05/21 1732    Specimen: Blood Updated: 07/05/21 1800     Glucose 141 mg/dL      BUN 37 mg/dL      Creatinine 3.94 mg/dL      Sodium 139 mmol/L      Potassium 4.1 mmol/L      Chloride 99 mmol/L      CO2 25.5 mmol/L      Calcium 10.6 mg/dL      Total Protein 8.0 g/dL      Albumin 4.70 g/dL      ALT (SGPT) 12 U/L      AST (SGOT) 15 U/L      Alkaline Phosphatase 63 U/L      Total Bilirubin 0.8 mg/dL      eGFR Non African Amer 17 mL/min/1.73      Globulin 3.3 gm/dL      A/G Ratio 1.4 g/dL      BUN/Creatinine Ratio 9.4     Anion Gap 14.5 mmol/L     Narrative:      GFR Normal >60  Chronic Kidney Disease <60  Kidney Failure <15      Protime-INR [[id number]]  (Abnormal) Collected: 07/05/21 1732    Specimen: Blood Updated: 07/05/21 1753     Protime 14.3 Seconds      INR 1.11    Narrative:      Therapeutic Ranges for INR: 2.0-3.0 (PT 20-30)                              2.5-3.5 (PT 25-34)    D-dimer, Quantitative [[id number]]  (Normal) Collected: 07/05/21 1732    Specimen: Blood Updated: 07/05/21 1753     D-Dimer, Quantitative 0.36 MCGFEU/mL     Narrative:      Can be elevated in, but is not diagnostic for deep vein thrombosis (DVT) or pulmonary embolis (PE).  It is also elevated in other medical conditions.  Clinical correlation is required.  The negative cut-off value for the D-Dimer is 0.50 mcg FEU/mL for DVT and PE.      aPTT [[id number]]  (Normal) Collected: 07/05/21 1732    Specimen: Blood Updated: 07/05/21 1753     PTT 26.8 seconds     Narrative:      PTT = The equivalent PTT values for the therapeutic range of heparin levels at 0.1 to 0.7 U/ml are 53 to 110 seconds.      CBC & Differential [[id number]]  (Abnormal) Collected: 07/05/21 1732    Specimen: Blood Updated: 07/05/21 1740    Narrative:      The following orders were created for panel order CBC & Differential.  Procedure                               Abnormality          Status                     ---------                               -----------         ------                     CBC Auto Differential[[id number]]         Abnormal            Final result                 Please view results for these tests on the individual orders.    CBC Auto Differential [[id number]]  (Abnormal) Collected: 07/05/21 1732    Specimen: Blood Updated: 07/05/21 1740     WBC 11.09 10*3/mm3      RBC 5.01 10*6/mm3      Hemoglobin 14.5 g/dL      Hematocrit 45.8 %      MCV 91.4 fL      MCH 28.9 pg      MCHC 31.7 g/dL      RDW 14.3 %      RDW-SD 47.6 fl      MPV 9.8 fL      Platelets 332 10*3/mm3      Neutrophil % 79.7 %      Lymphocyte % 11.2 %      Monocyte % 7.8 %      Eosinophil % 0.5 %      Basophil % 0.3 %      Immature Grans % 0.5 %      Neutrophils, Absolute 8.85 10*3/mm3      Lymphocytes, Absolute 1.24 10*3/mm3      Monocytes, Absolute 0.86 10*3/mm3      Eosinophils, Absolute 0.06 10*3/mm3      Basophils, Absolute 0.03 10*3/mm3      Immature Grans, Absolute 0.05 10*3/mm3      nRBC 0.0 /100 WBC           Imaging Results (Most Recent)     Procedure Component Value Units Date/Time    XR Chest 1 View [[id number]] Collected: 07/05/21 1845     Updated: 07/05/21 1847    Narrative:      CR Chest 1 Vw    INDICATION:   Chest pain and shortness of breath     COMPARISON:    None available.    FINDINGS:  Single portable AP view(s) of the chest. Limited exam. The heart and mediastinal contours are normal. The lungs are clear. No pneumothorax or pleural effusion.      Impression:      No definite acute cardiopulmonary findings.    Signer Name: Geena Calzada MD   Signed: 7/5/2021 6:45 PM   Workstation Name: [id number]    [facility]    XR Ankle 3+ View Right [[id number]] Collected: 07/05/21 1843     Updated: 07/05/21 1846    Narrative:      CR Ankle Min 3 Vws RT    INDICATION:   Chronic ankle pain    COMPARISON:   None.    FINDINGS:  3 view(s) of the right ankle.  No fracture or dislocation. No  bone erosion or destruction. There is degenerative change with a plantar spur. There also appears to be osseous abnormality in region of the posterior process of the talus that appears chronic  and may be contributing to posterior impingement of the ankle. Degenerative changes are seen involving the midfoot. No foreign body.      Impression:      No acute fracture or subluxation. No acute findings. There is degenerative change.    Signer Name: Geena Calzada MD   Signed: 7/5/2021 6:43 PM   Workstation Name: [id number]    [facility]        reviewed    ECG/EMG Results (most recent)     Procedure Component Value Units Date/Time    ECG 12 Lead [[id number]] Collected: 07/05/21 1711     Updated: 07/05/21 1730     QT Interval 351 ms     Narrative:      HEART RATE= 93  bpm  RR Interval= 648  ms  MI Interval= 143  ms  P Horizontal Axis= -7  deg  P Front Axis= 47  deg  QRSD Interval= 93  ms  QT Interval= 351  ms  QRS Axis= 64  deg  T Wave Axis= -48  deg  - ABNORMAL ECG -  Sinus rhythm  Inferior infarct, age indeterminate  Electronically Signed By:   Date and Time of Study: 2021-07-05 17:11:53    ECG 12 Lead [[id number]] Collected: 07/06/21 0644     Updated: 07/06/21 0645     QT Interval 411 ms     Narrative:      HEART RATE= 65  bpm  RR Interval= 924  ms  MI Interval= 162  ms  P Horizontal Axis= 12  deg  P Front Axis= 53  deg  QRSD Interval= 101  ms  QT Interval= 411  ms  QRS Axis= 60  deg  T Wave Axis= 12  deg  - OTHERWISE NORMAL ECG -  Sinus rhythm  Minimal ST elevation, anterior leads  Electronically Signed By:   Date and Time of Study: 2021-07-06 06:44:51        reviewed    Assessment/Plan     Atypical chest pain  Consult cardiology  2D echo pending  No anticoagulation presently  Status post aspirin  Blood pressures controlled  Troponin unremarkable    Acute kidney injury creatinine greater than 3  Consult nephrology  Gentle IV fluids for now, stop ACE inhibitor's and HCTZ    Morbid obesity  Diet weight  loss per guidelines suggested    DVT prophylaxis    We will address any medications on discharge    Further recommendations to follow findings and clinical course    Oh Warner MD, PhD    I discussed the patient's findings and my recommendations with patient and *staff    Oh Warner MD  07/06/21  07:44 EDT

## 2021-07-06 NOTE — PAYOR COMM NOTE
"Don Carrera (46 y.o. Male)     ATTN: NURSE REVIEWER  RE:  OBSERVATION ADMIT - AUTH  REQUEST FOR PROCEDURES  REF# [id number]  PLS REPLY TO GEEAN EDUARDO [phone] FAX# [phone]      Date of Birth Social Security Number Address Home Phone MRN    1975  [address] [place] [address] [phone] [id number]    Buddhism Marital Status          None Single       Admission Date Admission Type Admitting Provider Attending Provider Department, Room/Bed    7/5/21 Emergency Oh Warner MD Keith, Matthew Cody Lee, MD [facility] MED SURG, 1417/1    Discharge Date Discharge Disposition Discharge Destination                       Attending Provider: Oh Warner MD    Allergies: No Known Allergies    Isolation: None   Infection: None   Code Status: Not on file    Ht: 170 cm (66.93\")   Wt: 160 kg (352 lb 11.8 oz)    Admission Cmt: None   Principal Problem: None                Active Insurance as of 7/5/2021     Primary Coverage     Payor Plan Insurance Group Employer/Plan Group    [employer] [place] [employer] [place]      Payor Plan Address Payor Plan Phone Number Payor Plan Fax Number Effective Dates    [address] [address] [phone]  7/5/2021 - None Entered    [facility] [id number]       Subscriber Name Subscriber Birth Date Member ID       DON CARRERA 1975 [id number]                 Emergency Contacts      (Rel.) Home Phone Work Phone Mobile Phone    Ro Carrera (Other) [phone] -- --    VIDHYA CARRERA (Other) [phone] --               History & Physical      Oh Warner MD at 07/06/21 0744              Hospitalist Team H&P      Patient Care Team:  Deana Mcconnell MD as PCP - General (Internal Medicine)    CHIEF COMPLAINT: Chest pain    HISTORY OF PRESENT ILLNESS:    This 46-year-old gentleman, morbidly obese, who presented with atypical left-sided chest pain fairly constant, worsens with deep breathing, " folic his chest was tight at work, presented to the emergency department ultimately with risk factors nonresolving intermittent chest pain, he was admitted for observation.  Chest pain going on for few days waxing and waning, no history of any abdominal pain or flank pain, no history of any UTIs or recent infections.  Troponins unremarkable there was findings of acute kidney injury with creatinine greater than 3.  He is an everyday smoker of up to 2 packs/day, minimal alcohol, he takes HCTZ lisinopril for hypertension only.  The labs and electrolytes appear to be okay other than BUN/creatinine.  LFTs appear to be okay, no sign of infection with normal white count not anemic.  No other complaints, no fever chills sweats nausea vomiting or diarrhea.    Review of systems otherwise negative x14 point review of systems except as mentioned above  Historical data copied forward from previous encounters in EMR is unchanged      Past Medical History:   Diagnosis Date   • Hypertension    • Hypoglycemia      History reviewed. No pertinent surgical history.  History reviewed. No pertinent family history.  Social History     Tobacco Use   • Smoking status: Current Every Day Smoker     Packs/day: 2.00     Types: Cigarettes   • Smokeless tobacco: Never Used   Substance Use Topics   • Alcohol use: Not Currently     Comment: occasionally   • Drug use: Never     Medications Prior to Admission   Medication Sig Dispense Refill Last Dose   • lisinopril-hydrochlorothiazide (PRINZIDE,ZESTORETIC) 20-25 MG per tablet Take 1 tablet by mouth Daily.   7/5/2021 at 1000     Allergies:  Patient has no known allergies.    REVIEW OF SYSTEMS:  Please see the above history of present illness for pertinent positives and negatives.  The remainder of the patient's systems have been reviewed and are negative.  Vital Signs  Temp:  [98.3 °F (36.8 °C)-98.7 °F (37.1 °C)] 98.7 °F (37.1 °C)  Heart Rate:  [67-95] 67  Resp:  [20-32] 20  BP: ()/(41-81)  "105/63    Flowsheet Rows      First Filed Value   Admission Height  170.2 cm (67\") Documented at 07/05/2021 1711   Admission Weight  (!) 157 kg (347 lb) Documented at 07/05/2021 1711           Physical Exam:  Physical Exam   Constitutional: Patient appears well-developed and well-nourished and in no acute distress   HEENT:   Head: Normocephalic and atraumatic.   Eyes:  Pupils are equal, round, and reactive to light. EOM are intact. Sclerae are anicteric and noninjected.  Mouth and Throat: Patient has moist mucous membranes. Oropharynx is clear of any erythema or exudate.     Neck: Neck supple. No JVD present. No thyromegaly present. No lymphadenopathy present.  Cardiovascular: Regular rate, regular rhythm, S1 normal and S2 normal.  Exam reveals no gallop and no friction rub.  No murmur heard.  Pulmonary/Chest: Lungs are clear to auscultation bilaterally. No respiratory distress. No wheezes. No rhonchi. No rales.   Abdominal: Morbidly obese soft. Bowel sounds are normal. No distension and no mass. There is no hepatosplenomegaly. There is no tenderness.   Musculoskeletal: Normal muscle tone  Extremities: No edema. Pulses are palpable in all 4 extremities.  Neurological: Patient is alert and oriented to person, place, and time. Cranial nerves II-XII are grossly intact with no focal deficits.  Skin: Skin is warm. No rash noted. Nails show no clubbing.  No cyanosis or erythema.     Results Review:    I reviewed the patient's new clinical results.  Lab Results (most recent)     Procedure Component Value Units Date/Time    Comprehensive Metabolic Panel [[id number]]  (Abnormal) Collected: 07/06/21 0424    Specimen: Blood Updated: 07/06/21 0521     Glucose 116 mg/dL      BUN 45 mg/dL      Creatinine 3.33 mg/dL      Sodium 141 mmol/L      Potassium 3.9 mmol/L      Chloride 103 mmol/L      CO2 27.5 mmol/L      Calcium 9.7 mg/dL      Total Protein 6.6 g/dL      Albumin 3.90 g/dL      ALT (SGPT) 13 U/L      AST (SGOT) 19 U/L      " Alkaline Phosphatase 53 U/L      Total Bilirubin 0.5 mg/dL      eGFR Non African Amer 20 mL/min/1.73      Globulin 2.7 gm/dL      A/G Ratio 1.4 g/dL      BUN/Creatinine Ratio 13.5     Anion Gap 10.5 mmol/L     Narrative:      GFR Normal >60  Chronic Kidney Disease <60  Kidney Failure <15      Troponin [[id number]]  (Normal) Collected: 07/06/21 0424    Specimen: Blood Updated: 07/06/21 0521     Troponin T <0.010 ng/mL     Narrative:      Troponin T Reference Range:  <= 0.03 ng/mL-   Negative for AMI  >0.03 ng/mL-     Abnormal for myocardial necrosis.  Clinicians would have to utilize clinical acumen, EKG, Troponin and serial changes to determine if it is an Acute Myocardial Infarction or myocardial injury due to an underlying chronic condition.       Results may be falsely decreased if patient taking Biotin.      CBC & Differential [[id number]]  (Abnormal) Collected: 07/06/21 0424    Specimen: Blood Updated: 07/06/21 0449    Narrative:      The following orders were created for panel order CBC & Differential.  Procedure                               Abnormality         Status                     ---------                               -----------         ------                     CBC Auto Differential[[id number]]        Abnormal            Final result                 Please view results for these tests on the individual orders.    CBC Auto Differential [[id number]]  (Abnormal) Collected: 07/06/21 0424    Specimen: Blood Updated: 07/06/21 0449     WBC 7.91 10*3/mm3      RBC 4.29 10*6/mm3      Hemoglobin 12.6 g/dL      Hematocrit 40.9 %      MCV 95.3 fL      MCH 29.4 pg      MCHC 30.8 g/dL      RDW 14.4 %      RDW-SD 50.0 fl      MPV 10.1 fL      Platelets 254 10*3/mm3      Neutrophil % 61.7 %      Lymphocyte % 25.4 %      Monocyte % 10.4 %      Eosinophil % 1.6 %      Basophil % 0.4 %      Immature Grans % 0.5 %      Neutrophils, Absolute 4.88 10*3/mm3      Lymphocytes, Absolute 2.01 10*3/mm3      Monocytes, Absolute  0.82 10*3/mm3      Eosinophils, Absolute 0.13 10*3/mm3      Basophils, Absolute 0.03 10*3/mm3      Immature Grans, Absolute 0.04 10*3/mm3      nRBC 0.0 /100 WBC     COVID-19,[facility] IN-HOUSE,Nasal Swab No Transport Media 3-4 HR TAT - Swab, Nasal Cavity [[id number]]  (Normal) Collected: 07/05/21 1957    Specimen: Swab from Nasal Cavity Updated: 07/05/21 2032     COVID19 Not Detected    Narrative:      Fact sheet for providers: https://www.fda.gov/media/669238/download     Fact sheet for patients: https://www.fda.gov/media/404546/download    Test performed by PCR.    Consider negative results in combination with clinical observations, patient history, and epidemiological information.    BNP [[id number]]  (Normal) Collected: 07/05/21 1732    Specimen: Blood Updated: 07/05/21 1807     proBNP 268.1 pg/mL     Narrative:      Among patients with dyspnea, NT-proBNP is highly sensitive for the detection of acute congestive heart failure. In addition NT-proBNP of <300 pg/ml effectively rules out acute congestive heart failure with 99% negative predictive value.    Results may be falsely decreased if patient taking Biotin.      Troponin [[id number]]  (Normal) Collected: 07/05/21 1732    Specimen: Blood Updated: 07/05/21 1802     Troponin T <0.010 ng/mL     Narrative:      Troponin T Reference Range:  <= 0.03 ng/mL-   Negative for AMI  >0.03 ng/mL-     Abnormal for myocardial necrosis.  Clinicians would have to utilize clinical acumen, EKG, Troponin and serial changes to determine if it is an Acute Myocardial Infarction or myocardial injury due to an underlying chronic condition.       Results may be falsely decreased if patient taking Biotin.      Comprehensive Metabolic Panel [[id number]]  (Abnormal) Collected: 07/05/21 1732    Specimen: Blood Updated: 07/05/21 1800     Glucose 141 mg/dL      BUN 37 mg/dL      Creatinine 3.94 mg/dL      Sodium 139 mmol/L      Potassium 4.1 mmol/L      Chloride 99 mmol/L      CO2 25.5 mmol/L       Calcium 10.6 mg/dL      Total Protein 8.0 g/dL      Albumin 4.70 g/dL      ALT (SGPT) 12 U/L      AST (SGOT) 15 U/L      Alkaline Phosphatase 63 U/L      Total Bilirubin 0.8 mg/dL      eGFR Non African Amer 17 mL/min/1.73      Globulin 3.3 gm/dL      A/G Ratio 1.4 g/dL      BUN/Creatinine Ratio 9.4     Anion Gap 14.5 mmol/L     Narrative:      GFR Normal >60  Chronic Kidney Disease <60  Kidney Failure <15      Protime-INR [[id number]]  (Abnormal) Collected: 07/05/21 1732    Specimen: Blood Updated: 07/05/21 1753     Protime 14.3 Seconds      INR 1.11    Narrative:      Therapeutic Ranges for INR: 2.0-3.0 (PT 20-30)                              2.5-3.5 (PT 25-34)    D-dimer, Quantitative [[id number]]  (Normal) Collected: 07/05/21 1732    Specimen: Blood Updated: 07/05/21 1753     D-Dimer, Quantitative 0.36 MCGFEU/mL     Narrative:      Can be elevated in, but is not diagnostic for deep vein thrombosis (DVT) or pulmonary embolis (PE).  It is also elevated in other medical conditions.  Clinical correlation is required.  The negative cut-off value for the D-Dimer is 0.50 mcg FEU/mL for DVT and PE.      aPTT [[id number]]  (Normal) Collected: 07/05/21 1732    Specimen: Blood Updated: 07/05/21 1753     PTT 26.8 seconds     Narrative:      PTT = The equivalent PTT values for the therapeutic range of heparin levels at 0.1 to 0.7 U/ml are 53 to 110 seconds.      CBC & Differential [[id number]]  (Abnormal) Collected: 07/05/21 1732    Specimen: Blood Updated: 07/05/21 1740    Narrative:      The following orders were created for panel order CBC & Differential.  Procedure                               Abnormality         Status                     ---------                               -----------         ------                     CBC Auto Differential[[id number]]         Abnormal            Final result                 Please view results for these tests on the individual orders.    CBC Auto Differential [[id number]]  (Abnormal)  Collected: 07/05/21 1732    Specimen: Blood Updated: 07/05/21 1740     WBC 11.09 10*3/mm3      RBC 5.01 10*6/mm3      Hemoglobin 14.5 g/dL      Hematocrit 45.8 %      MCV 91.4 fL      MCH 28.9 pg      MCHC 31.7 g/dL      RDW 14.3 %      RDW-SD 47.6 fl      MPV 9.8 fL      Platelets 332 10*3/mm3      Neutrophil % 79.7 %      Lymphocyte % 11.2 %      Monocyte % 7.8 %      Eosinophil % 0.5 %      Basophil % 0.3 %      Immature Grans % 0.5 %      Neutrophils, Absolute 8.85 10*3/mm3      Lymphocytes, Absolute 1.24 10*3/mm3      Monocytes, Absolute 0.86 10*3/mm3      Eosinophils, Absolute 0.06 10*3/mm3      Basophils, Absolute 0.03 10*3/mm3      Immature Grans, Absolute 0.05 10*3/mm3      nRBC 0.0 /100 WBC           Imaging Results (Most Recent)     Procedure Component Value Units Date/Time    XR Chest 1 View [[id number]] Collected: 07/05/21 1845     Updated: 07/05/21 1847    Narrative:      CR Chest 1 Vw    INDICATION:   Chest pain and shortness of breath     COMPARISON:    None available.    FINDINGS:  Single portable AP view(s) of the chest. Limited exam. The heart and mediastinal contours are normal. The lungs are clear. No pneumothorax or pleural effusion.      Impression:      No definite acute cardiopulmonary findings.    Signer Name: Geena Calzada MD   Signed: 7/5/2021 6:45 PM   Workstation Name: [id number]    [facility]    XR Ankle 3+ View Right [[id number]] Collected: 07/05/21 1843     Updated: 07/05/21 1846    Narrative:      CR Ankle Min 3 Vws RT    INDICATION:   Chronic ankle pain    COMPARISON:   None.    FINDINGS:  3 view(s) of the right ankle.  No fracture or dislocation. No bone erosion or destruction. There is degenerative change with a plantar spur. There also appears to be osseous abnormality in region of the posterior process of the talus that appears chronic  and may be contributing to posterior impingement of the ankle. Degenerative changes are seen involving the midfoot. No  foreign body.      Impression:      No acute fracture or subluxation. No acute findings. There is degenerative change.    Signer Name: Geena Calzada MD   Signed: 7/5/2021 6:43 PM   Workstation Name: [id number]    [facility]        reviewed    ECG/EMG Results (most recent)     Procedure Component Value Units Date/Time    ECG 12 Lead [[id number]] Collected: 07/05/21 1711     Updated: 07/05/21 1730     QT Interval 351 ms     Narrative:      HEART RATE= 93  bpm  RR Interval= 648  ms  FL Interval= 143  ms  P Horizontal Axis= -7  deg  P Front Axis= 47  deg  QRSD Interval= 93  ms  QT Interval= 351  ms  QRS Axis= 64  deg  T Wave Axis= -48  deg  - ABNORMAL ECG -  Sinus rhythm  Inferior infarct, age indeterminate  Electronically Signed By:   Date and Time of Study: 2021-07-05 17:11:53    ECG 12 Lead [[id number]] Collected: 07/06/21 0644     Updated: 07/06/21 0645     QT Interval 411 ms     Narrative:      HEART RATE= 65  bpm  RR Interval= 924  ms  FL Interval= 162  ms  P Horizontal Axis= 12  deg  P Front Axis= 53  deg  QRSD Interval= 101  ms  QT Interval= 411  ms  QRS Axis= 60  deg  T Wave Axis= 12  deg  - OTHERWISE NORMAL ECG -  Sinus rhythm  Minimal ST elevation, anterior leads  Electronically Signed By:   Date and Time of Study: 2021-07-06 06:44:51        reviewed    Assessment/Plan     Atypical chest pain  Consult cardiology  2D echo pending  No anticoagulation presently  Status post aspirin  Blood pressures controlled  Troponin unremarkable    Acute kidney injury creatinine greater than 3  Consult nephrology  Gentle IV fluids for now, stop ACE inhibitor's and HCTZ    Morbid obesity  Diet weight loss per guidelines suggested    DVT prophylaxis    We will address any medications on discharge    Further recommendations to follow findings and clinical course    Oh Warner MD, PhD    I discussed the patient's findings and my recommendations with patient and *staff    Oh Warner,  MD  07/06/21  07:44 EDT          Electronically signed by Oh Warner MD at 07/06/21 0748       Vital Signs (last day)     Date/Time   Temp   Temp src   Pulse   Resp   BP   Patient Position   SpO2    07/06/21 0832   --   --   --   --   105/63   --   --    07/06/21 0500   98.7 (37.1)   Oral   67   20   105/63   Lying   96    07/05/21 2300   98.6 (37)   Oral   80   20   108/65   Lying   93    07/05/21 2041   98.6 (37)   Oral   75   20   100/68   Lying   95    07/05/21 2000   --   --   71   --   (!) 87/61   --   97    07/05/21 1940   --   --   73   --   (!) 89/63   --   92    07/05/21 1920   --   --   77   --   96/59   --   90    07/05/21 1900   --   --   81   --   100/56   --   94    07/05/21 1840   --   --   --   --   92/47   --   --    07/05/21 1839   --   --   87   --   --   --   90    07/05/21 1825   --   --   86   20   --   --   --    07/05/21 1820   --   --   86   --   93/68   --   93    07/05/21 1815   --   --   88   20   --   --   91    07/05/21 1800   --   --   86   --   (!) 86/41   --   91    07/05/21 17:45:30   --   --   87   --   --   --   92    07/05/21 1711   98.3 (36.8)   Oral   95   (!) 32   104/81   Lying   93              Prior to Admission Medications     Prescriptions Last Dose Informant Patient Reported? Taking?    lisinopril-hydrochlorothiazide (PRINZIDE,ZESTORETIC) 20-25 MG per tablet 7/5/2021  Yes Yes    Take 1 tablet by mouth Daily.          Facility-Administered Medications as of 7/6/2021   Medication Dose Route Frequency Provider Last Rate Last Admin   • [COMPLETED] albuterol (PROVENTIL) nebulizer solution 0.083% 2.5 mg/3mL  2.5 mg Nebulization Once Aden Dyson MD   2.5 mg at 07/05/21 1815   • [COMPLETED] aspirin chewable tablet 324 mg  324 mg Oral Once Aden Dyson MD   324 mg at 07/05/21 1750   • nitroglycerin (NITROSTAT) SL tablet 0.4 mg  0.4 mg Sublingual Q5 Min PRN Aden Dyson MD   0.4 mg at 07/05/21 1753   • [COMPLETED] perflutren (DEFINITY) 8.476 mg in  Sodium Chloride (PF) 0.9 % 10 mL injection  2 mL Intravenous Once in imaging Oh Warner MD   2 mL at 07/06/21 0833   • [COMPLETED] regadenoson (LEXISCAN) injection 0.4 mg  0.4 mg Intravenous Once in imaging Oh Warenr MD   0.4 mg at 07/06/21 1242   • [COMPLETED] sodium chloride 0.9 % bolus 500 mL  500 mL Intravenous Once Aden Dyson MD   Stopped at 07/05/21 1944   • sodium chloride 0.9 % flush 10 mL  10 mL Intravenous PRN Aden Dyson MD       • sodium chloride 0.9 % infusion  125 mL/hr Intravenous Continuous Aden Dyson  mL/hr at 07/06/21 0939 125 mL/hr at 07/06/21 0939   • [COMPLETED] technetium sestamibi (CARDIOLITE) injection 1 dose  1 dose Intravenous Once in imaging Oh Warner MD   1 dose at 07/06/21 1242       Blood Administration Record (From admission, onward)    None          Lab Results (last 24 hours)     Procedure Component Value Units Date/Time    Comprehensive Metabolic Panel [[id number]]  (Abnormal) Collected: 07/06/21 0424    Specimen: Blood Updated: 07/06/21 0521     Glucose 116 mg/dL      BUN 45 mg/dL      Creatinine 3.33 mg/dL      Sodium 141 mmol/L      Potassium 3.9 mmol/L      Chloride 103 mmol/L      CO2 27.5 mmol/L      Calcium 9.7 mg/dL      Total Protein 6.6 g/dL      Albumin 3.90 g/dL      ALT (SGPT) 13 U/L      AST (SGOT) 19 U/L      Alkaline Phosphatase 53 U/L      Total Bilirubin 0.5 mg/dL      eGFR Non African Amer 20 mL/min/1.73      Globulin 2.7 gm/dL      A/G Ratio 1.4 g/dL      BUN/Creatinine Ratio 13.5     Anion Gap 10.5 mmol/L     Narrative:      GFR Normal >60  Chronic Kidney Disease <60  Kidney Failure <15      Troponin [[id number]]  (Normal) Collected: 07/06/21 0424    Specimen: Blood Updated: 07/06/21 0521     Troponin T <0.010 ng/mL     Narrative:      Troponin T Reference Range:  <= 0.03 ng/mL-   Negative for AMI  >0.03 ng/mL-     Abnormal for myocardial necrosis.  Clinicians would have to utilize clinical  acumen, EKG, Troponin and serial changes to determine if it is an Acute Myocardial Infarction or myocardial injury due to an underlying chronic condition.       Results may be falsely decreased if patient taking Biotin.      CBC & Differential [[id number]]  (Abnormal) Collected: 07/06/21 0424    Specimen: Blood Updated: 07/06/21 0449    Narrative:      The following orders were created for panel order CBC & Differential.  Procedure                               Abnormality         Status                     ---------                               -----------         ------                     CBC Auto Differential[[id number]]        Abnormal            Final result                 Please view results for these tests on the individual orders.    CBC Auto Differential [[id number]]  (Abnormal) Collected: 07/06/21 0424    Specimen: Blood Updated: 07/06/21 0449     WBC 7.91 10*3/mm3      RBC 4.29 10*6/mm3      Hemoglobin 12.6 g/dL      Hematocrit 40.9 %      MCV 95.3 fL      MCH 29.4 pg      MCHC 30.8 g/dL      RDW 14.4 %      RDW-SD 50.0 fl      MPV 10.1 fL      Platelets 254 10*3/mm3      Neutrophil % 61.7 %      Lymphocyte % 25.4 %      Monocyte % 10.4 %      Eosinophil % 1.6 %      Basophil % 0.4 %      Immature Grans % 0.5 %      Neutrophils, Absolute 4.88 10*3/mm3      Lymphocytes, Absolute 2.01 10*3/mm3      Monocytes, Absolute 0.82 10*3/mm3      Eosinophils, Absolute 0.13 10*3/mm3      Basophils, Absolute 0.03 10*3/mm3      Immature Grans, Absolute 0.04 10*3/mm3      nRBC 0.0 /100 WBC     COVID-19,[facility] IN-HOUSE,Nasal Swab No Transport Media 3-4 HR TAT - Swab, Nasal Cavity [[id number]]  (Normal) Collected: 07/05/21 1957    Specimen: Swab from Nasal Cavity Updated: 07/05/21 2032     COVID19 Not Detected    Narrative:      Fact sheet for providers: https://www.fda.gov/media/863238/download     Fact sheet for patients: https://www.fda.gov/media/008587/download    Test performed by PCR.    Consider negative results in  combination with clinical observations, patient history, and epidemiological information.        Imaging Results (Last 24 Hours)     ** No results found for the last 24 hours. **        ECG/EMG Results (last 24 hours)     Procedure Component Value Units Date/Time    ECG 12 Lead [[id number]] Collected: 07/06/21 0644     Updated: 07/06/21 1017     QT Interval 411 ms     Narrative:      HEART RATE= 65  bpm  RR Interval= 924  ms  MI Interval= 162  ms  P Horizontal Axis= 12  deg  P Front Axis= 53  deg  QRSD Interval= 101  ms  QT Interval= 411  ms  QRS Axis= 60  deg  T Wave Axis= 12  deg  - NORMAL ECG -  Sinus rhythm  c/w prior ecg, inferior st changes are no longer seen  Electronically Signed By: Li Gomez ([facility]) 06-Jul-2021 10:16:46  Date and Time of Study: 2021-07-06 06:44:51    ECG 12 Lead [[id number]] Collected: 07/05/21 1711     Updated: 07/06/21 1017     QT Interval 351 ms     Narrative:      HEART RATE= 93  bpm  RR Interval= 648  ms  MI Interval= 143  ms  P Horizontal Axis= -7  deg  P Front Axis= 47  deg  QRSD Interval= 93  ms  QT Interval= 351  ms  QRS Axis= 64  deg  T Wave Axis= -48  deg  - ABNORMAL ECG -  Sinus rhythm  Inferior infarct, age indeterminate  c/w prior ecg, inferior st depression now seen  Electronically Signed By: Li Gomez ([facility]) 06-Jul-2021 10:17:09  Date and Time of Study: 2021-07-05 17:11:53    Adult Transthoracic Echo Complete W/ Cont if Necessary Per Protocol [[id number]] Collected: 07/06/21 0743     Updated: 07/06/21 1129     BSA 2.6 m^2      IVSd 1.0 cm      LVIDd 4.6 cm      LVIDs 3.3 cm      LVPWd 0.88 cm      IVS/LVPW 1.1     FS 28.2 %      EDV(Teich) 99.3 ml      ESV(Teich) 45.1 ml      EF(Teich) 54.6 %      EDV(cubed) 99.9 ml      ESV(cubed) 36.9 ml      EF(cubed) 63.0 %      LV mass(C)d 148.2 grams      LV mass(C)dI 57.6 grams/m^2      SV(Teich) 54.2 ml      SI(Teich) 21.1 ml/m^2      SV(cubed) 63.0 ml      SI(cubed) 24.5 ml/m^2      asc Aorta Diam 3.2 cm      LVOT diam  2.1 cm      LVOT area 3.5 cm^2      LVOT area(traced) 3.5 cm^2      LVLd ap4 7.0 cm      EDV(MOD-sp4) 67.2 ml      LVLs ap4 6.0 cm      ESV(MOD-sp4) 29.8 ml      EF(MOD-sp4) 55.7 %      LVLd ap2 9.0 cm      EDV(MOD-sp2) 103.0 ml      LVLs ap2 7.9 cm      ESV(MOD-sp2) 46.2 ml      EF(MOD-sp2) 55.1 %      SV(MOD-sp4) 37.4 ml      SI(MOD-sp4) 14.5 ml/m^2      SV(MOD-sp2) 56.8 ml      SI(MOD-sp2) 22.1 ml/m^2      LV Hood Vol (BSA corrected) 26.1 ml/m^2      LV Sys Vol (BSA corrected) 11.6 ml/m^2      MV A dur 203.0 sec      MV E max star 100.0 cm/sec      MV A max star 59.2 cm/sec      MV E/A 1.7     MV V2 mean 58.1 cm/sec      MV mean PG 2.0 mmHg      MV V2 VTI 34.5 cm      MVA(VTI) 2.2 cm^2      MV P1/2t max star 98.4 cm/sec      MV P1/2t 99.7 msec      MVA(P1/2t) 2.2 cm^2      MV dec slope 289.0 cm/sec^2      MV dec time 185 sec      Ao V2 mean 100.0 cm/sec      Ao mean PG 5.0 mmHg      Ao mean PG (full) 2.0 mmHg      Ao V2 VTI 31.2 cm      FIDENCIO(I,A) 2.5 cm^2      FIDENCIO(I,D) 2.5 cm^2      LV V1 mean PG 3.0 mmHg      LV V1 mean 78.1 cm/sec      LV V1 VTI 22.3 cm      SV(LVOT) 77.2 ml      SI(LVOT) 30.0 ml/m^2      PA V2 max 101.0 cm/sec      PA max PG 4.1 mmHg      PA acc time 0.14 sec      RV V1 mean PG 1.0 mmHg      RV V1 mean 43.0 cm/sec      RV V1 VTI 16.1 cm      PA pr(Accel) 16.0 mmHg      MVA P1/2T LCG 2.2 cm^2      Lat Peak E' Star 12.9 cm/sec      Med Peak E' Star 11.1 cm/sec       CV ECHO JUDD - BZI_BMI 55.4 kilograms/m^2       CV ECHO JUDD - BSA(HAYCOCK) 2.8 m^2       CV ECHO JUDD - BZI_METRIC_WEIGHT 160.0 kg       CV ECHO JUDD - BZI_METRIC_HEIGHT 170.0 cm      Avg E/e' ratio 8.33     Target HR (85%) 150 bpm      Max. Pred. HR (100%) 176 bpm      Ao pk star 145.0 cm/sec      LV V1 max PG 5.6 mmHg      LV V1 max 118.0 cm/sec      Ao max PG 8 mmHg      Echo EF Estimated 55 %     Narrative:      · Estimated left ventricular EF = 55% Left ventricular systolic function   is normal. Normal left ventricular cavity  size and wall thickness noted.   All left ventricular wall segments contract normally. Left ventricular   diastolic function is consistent with (grade II w/high LAP)   pseudonormalization.  · Limited 2D and Doppler imaging of cardiac valves with no obvious valve   dysfunction..             Operative/Procedure Notes (last 24 hours) (Notes from 21 1538 through 21 153)    No notes of this type exist for this encounter.         Physician Progress Notes (last 24 hours) (Notes from 21 1538 through 21 153)    No notes of this type exist for this encounter.            Consult Notes (last 24 hours) (Notes from 21 1538 through 21 153)      Li Gomez MD at 21 0632      Consult Orders    1. Inpatient Cardiology Consult [[id number]] ordered by Oh Warner MD at 21 2222                   Patient Name: Don Guajardo  :1975  46 y.o.    Date of Admission: 2021  Date of Consultation:  21  Encounter Provider: Li Gomez MD  Place of Service: [facility] CARDIOLOGY  Referring Provider: No ref. provider found  Patient Care Team:  Deana Mcconnell MD as PCP - General (Internal Medicine)      Chief complaint:chest pain    History of Present Illness:     This is a 46-year-old male with history of hypertension, tobacco use, morbid obesity.  He presents with chest pain.    He came to the emergency department on 2021 with complaints of left-sided sharp constant chest pain.  He was able to go to bed but woke up to sleep several times the pain.  Pain was associated with short windedness.  He arrived to the emergency department, he was anxious and hyperventilating.  Troponins have been negative x2, normal proBNP, creatinine on arrival was 3.94 is now 3.33, potassium 3.9, otherwise normal CMP, normal CBC, normal D-dimer.  No tox screen was run.  ECG showed normal sinus rhythm with inferior slight ST  depression and T wave inversions as well as inferior Q waves.  Inferior ST changes are slightly better this morning.  Vitals have shown hypotension overnight with saturations 93% on room air, heart rate in the 80s and afebrile.  Medication at home was lisinopril hydrochlorothiazide.    We have been asked to see him for chest pain.    He works at [employer] and was with his sister, brother-in-law and nephew.  He says he has been short winded progressively for the last couple years without associated asthma.  In addition he has had intermittent left-sided chest pain for couple years but nothing it was as severe as what he had when he presented.  He has not felt his heart racing or skipping is had no dizziness or syncope.  Right now, at rest, he feels mildly short winded but has no chest pain.    Past Medical History:   Diagnosis Date   • Hypertension    • Hypoglycemia        History reviewed. No pertinent surgical history.      Prior to Admission medications    Medication Sig Start Date End Date Taking? Authorizing Provider   lisinopril-hydrochlorothiazide (PRINZIDE,ZESTORETIC) 20-25 MG per tablet Take 1 tablet by mouth Daily. 5/26/21 5/26/22 Yes ProviderSelma MD       No Known Allergies    Social History     Socioeconomic History   • Marital status: Single     Spouse name: Not on file   • Number of children: Not on file   • Years of education: Not on file   • Highest education level: Not on file   Tobacco Use   • Smoking status: Current Every Day Smoker     Packs/day: 2.00     Types: Cigarettes   • Smokeless tobacco: Never Used   Substance and Sexual Activity   • Alcohol use: Not Currently     Comment: occasionally   • Drug use: Never       History reviewed. No pertinent family history.    REVIEW OF SYSTEMS:   All systems reviewed.  Pertinent positives identified in HPI.  All other systems are negative.      Objective:     Vitals:    07/05/21 2000 07/05/21 2041 07/05/21 2300 07/06/21 0500   BP: (!) 87/61 100/68  "108/65 105/63   BP Location:  Right arm Right arm Right arm   Patient Position:  Lying Lying Lying   Pulse: 71 75 80 67   Resp:  20 20 20   Temp:  98.6 °F (37 °C) 98.6 °F (37 °C) 98.7 °F (37.1 °C)   TempSrc:  Oral Oral Oral   SpO2: 97% 95% 93% 96%   Weight:  (!) 160 kg (352 lb 3.2 oz)     Height:  170.2 cm (67\")       Body mass index is 55.16 kg/m².    General Appearance:    Alert, cooperative, in no acute distress   Head:    Normocephalic, without obvious abnormality, atraumatic   Eyes:            Lids and lashes normal, conjunctivae and sclerae normal, no icterus, no pallor, corneas clear   Ears:    Ears appear intact with no abnormalities noted   Throat:   No oral lesions, no thrush, oral mucosa moist, poor dentition   Neck:   No adenopathy, supple, trachea midline, no thyromegaly, no carotid bruit, no JVD   Back:     No kyphosis present, no scoliosis present, no skin lesions, erythema or scars, no tenderness to palpation, range of motion normal   Lungs:     Clear to auscultation, respirations regular, even and unlabored    Heart:    Regular rhythm and normal rate, normal S1 and S2, no murmur, no gallop, no rub, no click   Chest Wall:    No abnormalities observed   Abdomen:     Normal bowel sounds, no masses, no organomegaly, soft, nontender, nondistended, no guarding, no rebound  tenderness   Extremities:   Moves all extremities well, no edema, no cyanosis, no redness   Pulses:   Pulses palpable and equal bilaterally. Normal radial, carotid, dorsalis pedis and posterior tibial pulses bilaterally.    Skin:  Psychiatric:   No bleeding, bruising or rash    Alert and oriented x 3, normal mood and affect   Lab Review:     Results from last 7 days   Lab Units 07/06/21  0424   SODIUM mmol/L 141   POTASSIUM mmol/L 3.9   CHLORIDE mmol/L 103   CO2 mmol/L 27.5   BUN mg/dL 45*   CREATININE mg/dL 3.33*   CALCIUM mg/dL 9.7   BILIRUBIN mg/dL 0.5   ALK PHOS U/L 53   ALT (SGPT) U/L 13   AST (SGOT) U/L 19   GLUCOSE mg/dL 116* "     Results from last 7 days   Lab Units 07/06/21  0424 07/05/21  1732   TROPONIN T ng/mL <0.010 <0.010     Results from last 7 days   Lab Units 07/06/21  0424   WBC 10*3/mm3 7.91   HEMOGLOBIN g/dL 12.6*   HEMATOCRIT % 40.9   PLATELETS 10*3/mm3 254     Results from last 7 days   Lab Units 07/05/21  1732   INR  1.11*   APTT seconds 26.8                                     I personally viewed and interpreted the patient's EKG/Telemetry data.        Assessment and Plan:       1. Chest pain, no ACS but has risk factors and mildly abnormal ECG.  Set up stress study, may have to be a 2-day stress because of his obesity.  Will discuss stress images done today also check echocardiogram.  2. MIGUELINA, getting IV fluid and improving  3. Morbid obesity  4. Tobacco use  5. History of hypertension, now hypotensive.    Echo today.  Stress today, may have to be today given morbid obesity.  We will do stress images today.  Hold lisinopril hydrochlorothiazide due to MIGUELINA and hypotension.    Li Gomez MD  07/06/21  07:12 EDT                Electronically signed by Li Gomez MD at 07/06/21 3975

## 2021-07-06 NOTE — PLAN OF CARE
Goal Outcome Evaluation:  Plan of Care Reviewed With: patient        Progress: improving  Outcome Summary: Pt VSS, am labs pending. Pt continues tele, SR, HR 60's-80's. Pt continues room air. Pt denies pain, n/v/d overnight. Pt resting at this time.

## 2021-07-06 NOTE — PROGRESS NOTES
"Hospitalist Team      Patient Care Team:  Deana Mcconnell MD as PCP - General (Internal Medicine)        Chief Complaint:  Chest pain    Subjective    Interval History and ROS:      This patient was admitted with chest pain.  He has had no further chest pain since admission.  Patient had a stress test today but now needs a second part stress test tomorrow morning.  He will stay with us tonight.      Objective    Vital Signs  Temp:  [98.3 °F (36.8 °C)-98.7 °F (37.1 °C)] 98.7 °F (37.1 °C)  Heart Rate:  [67-95] 67  Resp:  [20-32] 20  BP: ()/(41-81) 105/63  Oxygen Therapy  SpO2: 96 %  Pulse Oximetry Type: Intermittent  Device (Oxygen Therapy): room air}  Flowsheet Rows      First Filed Value   Admission Height  170.2 cm (67\") Documented at 07/05/2021 1711   Admission Weight  (!) 157 kg (347 lb) Documented at 07/05/2021 1711          Body mass index is 55.36 kg/m².      Physical Exam:    Physical Exam  Vitals and nursing note reviewed.   Constitutional:       Comments: Cognitive defect suspected   Cardiovascular:      Rate and Rhythm: Normal rate and regular rhythm.   Pulmonary:      Effort: Pulmonary effort is normal.      Breath sounds: Normal breath sounds.   Abdominal:      General: Abdomen is flat.      Palpations: Abdomen is soft.   Skin:     General: Skin is warm and dry.   Neurological:      Mental Status: He is alert.      Comments: Oriented to person and place.   Psychiatric:      Comments: Patient is very pleasant and couteous.  However, I suspect that patient has a mild cognitive defect. Mother and brother are in the room and brother drives him to work every day.         Results Review:     I reviewed the patient's new clinical results.    Lab Results (last 24 hours)     Procedure Component Value Units Date/Time    Comprehensive Metabolic Panel [[id number]]  (Abnormal) Collected: 07/06/21 0424    Specimen: Blood Updated: 07/06/21 0521     Glucose 116 mg/dL      BUN 45 mg/dL      Creatinine 3.33 " mg/dL      Sodium 141 mmol/L      Potassium 3.9 mmol/L      Chloride 103 mmol/L      CO2 27.5 mmol/L      Calcium 9.7 mg/dL      Total Protein 6.6 g/dL      Albumin 3.90 g/dL      ALT (SGPT) 13 U/L      AST (SGOT) 19 U/L      Alkaline Phosphatase 53 U/L      Total Bilirubin 0.5 mg/dL      eGFR Non African Amer 20 mL/min/1.73      Globulin 2.7 gm/dL      A/G Ratio 1.4 g/dL      BUN/Creatinine Ratio 13.5     Anion Gap 10.5 mmol/L     Narrative:      GFR Normal >60  Chronic Kidney Disease <60  Kidney Failure <15      Troponin [[id number]]  (Normal) Collected: 07/06/21 0424    Specimen: Blood Updated: 07/06/21 0521     Troponin T <0.010 ng/mL     Narrative:      Troponin T Reference Range:  <= 0.03 ng/mL-   Negative for AMI  >0.03 ng/mL-     Abnormal for myocardial necrosis.  Clinicians would have to utilize clinical acumen, EKG, Troponin and serial changes to determine if it is an Acute Myocardial Infarction or myocardial injury due to an underlying chronic condition.       Results may be falsely decreased if patient taking Biotin.      CBC & Differential [[id number]]  (Abnormal) Collected: 07/06/21 0424    Specimen: Blood Updated: 07/06/21 0449    Narrative:      The following orders were created for panel order CBC & Differential.  Procedure                               Abnormality         Status                     ---------                               -----------         ------                     CBC Auto Differential[[id number]]        Abnormal            Final result                 Please view results for these tests on the individual orders.    CBC Auto Differential [[id number]]  (Abnormal) Collected: 07/06/21 0424    Specimen: Blood Updated: 07/06/21 0449     WBC 7.91 10*3/mm3      RBC 4.29 10*6/mm3      Hemoglobin 12.6 g/dL      Hematocrit 40.9 %      MCV 95.3 fL      MCH 29.4 pg      MCHC 30.8 g/dL      RDW 14.4 %      RDW-SD 50.0 fl      MPV 10.1 fL      Platelets 254 10*3/mm3      Neutrophil % 61.7 %       Lymphocyte % 25.4 %      Monocyte % 10.4 %      Eosinophil % 1.6 %      Basophil % 0.4 %      Immature Grans % 0.5 %      Neutrophils, Absolute 4.88 10*3/mm3      Lymphocytes, Absolute 2.01 10*3/mm3      Monocytes, Absolute 0.82 10*3/mm3      Eosinophils, Absolute 0.13 10*3/mm3      Basophils, Absolute 0.03 10*3/mm3      Immature Grans, Absolute 0.04 10*3/mm3      nRBC 0.0 /100 WBC     COVID-19,[facility] IN-HOUSE,Nasal Swab No Transport Media 3-4 HR TAT - Swab, Nasal Cavity [[id number]]  (Normal) Collected: 07/05/21 1957    Specimen: Swab from Nasal Cavity Updated: 07/05/21 2032     COVID19 Not Detected    Narrative:      Fact sheet for providers: https://www.fda.gov/media/831495/download     Fact sheet for patients: https://www.fda.gov/media/623446/download    Test performed by PCR.    Consider negative results in combination with clinical observations, patient history, and epidemiological information.    BNP [[id number]]  (Normal) Collected: 07/05/21 1732    Specimen: Blood Updated: 07/05/21 1807     proBNP 268.1 pg/mL     Narrative:      Among patients with dyspnea, NT-proBNP is highly sensitive for the detection of acute congestive heart failure. In addition NT-proBNP of <300 pg/ml effectively rules out acute congestive heart failure with 99% negative predictive value.    Results may be falsely decreased if patient taking Biotin.      Troponin [[id number]]  (Normal) Collected: 07/05/21 1732    Specimen: Blood Updated: 07/05/21 1802     Troponin T <0.010 ng/mL     Narrative:      Troponin T Reference Range:  <= 0.03 ng/mL-   Negative for AMI  >0.03 ng/mL-     Abnormal for myocardial necrosis.  Clinicians would have to utilize clinical acumen, EKG, Troponin and serial changes to determine if it is an Acute Myocardial Infarction or myocardial injury due to an underlying chronic condition.       Results may be falsely decreased if patient taking Biotin.      Comprehensive Metabolic Panel [[id number]]  (Abnormal) Collected:  07/05/21 1732    Specimen: Blood Updated: 07/05/21 1800     Glucose 141 mg/dL      BUN 37 mg/dL      Creatinine 3.94 mg/dL      Sodium 139 mmol/L      Potassium 4.1 mmol/L      Chloride 99 mmol/L      CO2 25.5 mmol/L      Calcium 10.6 mg/dL      Total Protein 8.0 g/dL      Albumin 4.70 g/dL      ALT (SGPT) 12 U/L      AST (SGOT) 15 U/L      Alkaline Phosphatase 63 U/L      Total Bilirubin 0.8 mg/dL      eGFR Non African Amer 17 mL/min/1.73      Globulin 3.3 gm/dL      A/G Ratio 1.4 g/dL      BUN/Creatinine Ratio 9.4     Anion Gap 14.5 mmol/L     Narrative:      GFR Normal >60  Chronic Kidney Disease <60  Kidney Failure <15            Imaging Results (Last 24 Hours)     Procedure Component Value Units Date/Time    XR Chest 1 View [[id number]] Collected: 07/05/21 1845     Updated: 07/05/21 1847    Narrative:      CR Chest 1 Vw    INDICATION:   Chest pain and shortness of breath     COMPARISON:    None available.    FINDINGS:  Single portable AP view(s) of the chest. Limited exam. The heart and mediastinal contours are normal. The lungs are clear. No pneumothorax or pleural effusion.      Impression:      No definite acute cardiopulmonary findings.    Signer Name: Geena Calzada MD   Signed: 7/5/2021 6:45 PM   Workstation Name: [id number]    [facility]    XR Ankle 3+ View Right [[id number]] Collected: 07/05/21 1843     Updated: 07/05/21 1846    Narrative:      CR Ankle Min 3 Vws RT    INDICATION:   Chronic ankle pain    COMPARISON:   None.    FINDINGS:  3 view(s) of the right ankle.  No fracture or dislocation. No bone erosion or destruction. There is degenerative change with a plantar spur. There also appears to be osseous abnormality in region of the posterior process of the talus that appears chronic  and may be contributing to posterior impingement of the ankle. Degenerative changes are seen involving the midfoot. No foreign body.      Impression:      No acute fracture or subluxation. No acute  findings. There is degenerative change.    Signer Name: Geena Calzada MD   Signed: 7/5/2021 6:43 PM   Workstation Name: [id number]    [facility]          X-ray not reviewed personally by physician.      ECG not reviewed personally by physician  ECG/EMG Results (most recent)     Procedure Component Value Units Date/Time    ECG 12 Lead [[id number]] Collected: 07/06/21 0644     Updated: 07/06/21 1017     QT Interval 411 ms     Narrative:      HEART RATE= 65  bpm  RR Interval= 924  ms  NV Interval= 162  ms  P Horizontal Axis= 12  deg  P Front Axis= 53  deg  QRSD Interval= 101  ms  QT Interval= 411  ms  QRS Axis= 60  deg  T Wave Axis= 12  deg  - NORMAL ECG -  Sinus rhythm  c/w prior ecg, inferior st changes are no longer seen  Electronically Signed By: Li Gomez ([facility]) 06-Jul-2021 10:16:46  Date and Time of Study: 2021-07-06 06:44:51    ECG 12 Lead [[id number]] Collected: 07/05/21 1711     Updated: 07/06/21 1017     QT Interval 351 ms     Narrative:      HEART RATE= 93  bpm  RR Interval= 648  ms  NV Interval= 143  ms  P Horizontal Axis= -7  deg  P Front Axis= 47  deg  QRSD Interval= 93  ms  QT Interval= 351  ms  QRS Axis= 64  deg  T Wave Axis= -48  deg  - ABNORMAL ECG -  Sinus rhythm  Inferior infarct, age indeterminate  c/w prior ecg, inferior st depression now seen  Electronically Signed By: Li Gomez ([facility]) 06-Jul-2021 10:17:09  Date and Time of Study: 2021-07-05 17:11:53    Adult Transthoracic Echo Complete W/ Cont if Necessary Per Protocol [[id number]] Collected: 07/06/21 0743     Updated: 07/06/21 1129     BSA 2.6 m^2      IVSd 1.0 cm      LVIDd 4.6 cm      LVIDs 3.3 cm      LVPWd 0.88 cm      IVS/LVPW 1.1     FS 28.2 %      EDV(Teich) 99.3 ml      ESV(Teich) 45.1 ml      EF(Teich) 54.6 %      EDV(cubed) 99.9 ml      ESV(cubed) 36.9 ml      EF(cubed) 63.0 %      LV mass(C)d 148.2 grams      LV mass(C)dI 57.6 grams/m^2      SV(Teich) 54.2 ml      SI(Teich) 21.1 ml/m^2      SV(cubed)  63.0 ml      SI(cubed) 24.5 ml/m^2      asc Aorta Diam 3.2 cm      LVOT diam 2.1 cm      LVOT area 3.5 cm^2      LVOT area(traced) 3.5 cm^2      LVLd ap4 7.0 cm      EDV(MOD-sp4) 67.2 ml      LVLs ap4 6.0 cm      ESV(MOD-sp4) 29.8 ml      EF(MOD-sp4) 55.7 %      LVLd ap2 9.0 cm      EDV(MOD-sp2) 103.0 ml      LVLs ap2 7.9 cm      ESV(MOD-sp2) 46.2 ml      EF(MOD-sp2) 55.1 %      SV(MOD-sp4) 37.4 ml      SI(MOD-sp4) 14.5 ml/m^2      SV(MOD-sp2) 56.8 ml      SI(MOD-sp2) 22.1 ml/m^2      LV Hood Vol (BSA corrected) 26.1 ml/m^2      LV Sys Vol (BSA corrected) 11.6 ml/m^2      MV A dur 203.0 sec      MV E max star 100.0 cm/sec      MV A max star 59.2 cm/sec      MV E/A 1.7     MV V2 mean 58.1 cm/sec      MV mean PG 2.0 mmHg      MV V2 VTI 34.5 cm      MVA(VTI) 2.2 cm^2      MV P1/2t max star 98.4 cm/sec      MV P1/2t 99.7 msec      MVA(P1/2t) 2.2 cm^2      MV dec slope 289.0 cm/sec^2      MV dec time 185 sec      Ao V2 mean 100.0 cm/sec      Ao mean PG 5.0 mmHg      Ao mean PG (full) 2.0 mmHg      Ao V2 VTI 31.2 cm      FIDENCIO(I,A) 2.5 cm^2      FIDENCIO(I,D) 2.5 cm^2      LV V1 mean PG 3.0 mmHg      LV V1 mean 78.1 cm/sec      LV V1 VTI 22.3 cm      SV(LVOT) 77.2 ml      SI(LVOT) 30.0 ml/m^2      PA V2 max 101.0 cm/sec      PA max PG 4.1 mmHg      PA acc time 0.14 sec      RV V1 mean PG 1.0 mmHg      RV V1 mean 43.0 cm/sec      RV V1 VTI 16.1 cm      PA pr(Accel) 16.0 mmHg      MVA P1/2T LCG 2.2 cm^2      Lat Peak E' Star 12.9 cm/sec      Med Peak E' Star 11.1 cm/sec       CV ECHO JUDD - BZI_BMI 55.4 kilograms/m^2       CV ECHO JUDD - BSA(HAYCOCK) 2.8 m^2       CV ECHO JUDD - BZI_METRIC_WEIGHT 160.0 kg       CV ECHO JUDD - BZI_METRIC_HEIGHT 170.0 cm      Avg E/e' ratio 8.33     Target HR (85%) 150 bpm      Max. Pred. HR (100%) 176 bpm      Ao pk star 145.0 cm/sec      LV V1 max PG 5.6 mmHg      LV V1 max 118.0 cm/sec      Ao max PG 8 mmHg      Echo EF Estimated 55 %     Narrative:      · Estimated left ventricular EF = 55%  Left ventricular systolic function   is normal. Normal left ventricular cavity size and wall thickness noted.   All left ventricular wall segments contract normally. Left ventricular   diastolic function is consistent with (grade II w/high LAP)   pseudonormalization.  · Limited 2D and Doppler imaging of cardiac valves with no obvious valve   dysfunction..             Medication Review:   I have reviewed the patient's current medication list    Current Facility-Administered Medications:   •  nitroglycerin (NITROSTAT) SL tablet 0.4 mg, 0.4 mg, Sublingual, Q5 Min PRN, Aden Dyson MD, 0.4 mg at 07/05/21 7489  •  [COMPLETED] Insert peripheral IV, , , Once **AND** sodium chloride 0.9 % flush 10 mL, 10 mL, Intravenous, PRN, Aden Dyson MD  •  sodium chloride 0.9 % infusion, 125 mL/hr, Intravenous, Continuous, Aden Dyson MD, Last Rate: 125 mL/hr at 07/06/21 0939, 125 mL/hr at 07/06/21 0939                Assessment/Plan      Atypical chest pain   Consult cardiology   Echo   Troponin   Stress test/  Second part of stress test has to be done tomorrow       MIGUELINA   Consult Nephrology   IV hydration   Stop ACE and HCTZ    Body mass index is 55.36 kg/m².   Morbid obesity    Ankle pain/ chronic   PT saw patient and recommends outpatient workup with orthopedics concerning ankle        Plan for disposition:  IV hydration and see what Nephrology has to say about the patient.  Second part of stress test tomorrow.    Valerie Calvo DO  07/06/21  13:58 EDT        Time: 30 min

## 2021-07-06 NOTE — PLAN OF CARE
Goal Outcome Evaluation:  Plan of Care Reviewed With: patient        Progress: improving  Outcome Summary: Patietn ambulated in room with assistance and up to chair. Telemetry notes NSR. VSS. Voiding without difficulty. Echo and stress test done today. Will complete 2nd part of stress test in the morning. No complaints of pain, nausea, or shortness of breath.

## 2021-07-06 NOTE — CASE MANAGEMENT/SOCIAL WORK
Discharge Planning Assessment  [facility]     Patient Name: Don Guajardo  MRN: [id number]  Today's Date: 7/6/2021    Admit Date: 7/5/2021    Discharge Needs Assessment     Row Name 07/06/21 1414       Living Environment    Lives With  sibling(s)    Name(s) of Who Lives With Patient  Sebastian brother, sister in law and nephew    Current Living Arrangements  home/apartment/condo    Duration at Residence  20 years    Potentially Unsafe Housing Conditions  -- none identified    Primary Care Provided by  self    Provides Primary Care For  no one    Family Caregiver if Needed  sibling(s)    Family Caregiver Names  brother, sister in law, and nephew    Quality of Family Relationships  unable to assess    Able to Return to Prior Arrangements  yes       Resource/Environmental Concerns    Resource/Environmental Concerns  reliable transportation    Transportation Concerns  car, none       Transition Planning    Patient/Family Anticipates Transition to  home with family    Patient/Family Anticipated Services at Transition  none    Transportation Anticipated  family or friend will provide       Discharge Needs Assessment    Readmission Within the Last 30 Days  no previous admission in last 30 days    Current Outpatient/Agency/Support Group  -- none    Equipment Currently Used at Home  cane, straight    Concerns to be Addressed  no discharge needs identified    Anticipated Changes Related to Illness  none    Equipment Needed After Discharge  none    Outpatient/Agency/Support Group Needs  -- none    Discharge Facility/Level of Care Needs  -- none    Provided Post Acute Provider List?  Refused    Refused Provider List Comment  Pt declined    Provided Post Acute Provider Quality & Resource List?  Refused    Refused Quality and Resource List Comment  Pt declined    Current Discharge Risk  -- none identified        Discharge Plan     Row Name 07/06/21 1417       Plan    Plan  Discharge home with family    Patient/Family in Agreement  with Plan  yes    Plan Comments  I spoke with the patient at bedside with his permission.  I introduced myself and explained my role as . I verified the information on the facesheet and the patients PCP as Dr. Mcconnell. The patient stated that he uses [facility] in [place] and he can afford his medications and is able to pick them up.  The patient lives in a single story home with his brother, sister in law and nephew.  There is one step to enter the home and he is able to enter and maneuver around his home with no issues.  He has there for the past 20 years.  He is independent with his ADL’s.  He denied having a car or driving and advised that he has reliable transportation from family. He advised that his brother, Sebastian will pick him up at discharge.  The patient stated that he has a straight cane and denied needing and further DME.  I offered the patient home health and other community resources and he declined information regarding same.  The patient will discharge home with his family for assistance and he is agreeable to that plan.   He had no further questions or concerns at this time.  CM will continue to follow for further needs.        Continued Care and Services - Admitted Since 7/5/2021    Coordination has not been started for this encounter.         Demographic Summary     Row Name 07/06/21 1414       General Information    Admission Type  observation    Arrived From  home    Referral Source  admission list    Reason for Consult  discharge planning    Preferred Language  English     Used During This Interaction  no       Contact Information    Permission Granted to Share Info With          Functional Status    No documentation.       Psychosocial    No documentation.       Abuse/Neglect    No documentation.       Legal    No documentation.       Substance Abuse    No documentation.       Patient Forms    No documentation.           Ivy Sapp RN

## 2021-07-06 NOTE — THERAPY TREATMENT NOTE
Acute Care - Physical Therapy Note  [facility]     Patient Name: Don Guajardo  : 1975  MRN: [id number]  Today's Date: 2021        Noted physical therapy screen from nursing staff due to ankle pain x10 years.  Discussed current mobility and PLOF with patient.  Patient reports he ambulates using a SPC and denies need for rolling walker due to available space in the home.  Patient reports he has been told he has bone spurs on his ankle but reports no changes related to mobility at this time.  Would recommend potential follow up with ortho MD after discharge if ankle pain continues.  No acute care PT needs at this time, discussed with patient who is in agreement.             Christina Maloney, PT  2021

## 2021-07-06 NOTE — CONSULTS
Patient Name: Don Guajardo  :1975  46 y.o.    Date of Admission: 2021  Date of Consultation:  21  Encounter Provider: Li Gomez MD  Place of Service: [facility] CARDIOLOGY  Referring Provider: No ref. provider found  Patient Care Team:  Deana Mcconnell MD as PCP - General (Internal Medicine)      Chief complaint:chest pain    History of Present Illness:     This is a 46-year-old male with history of hypertension, tobacco use, morbid obesity.  He presents with chest pain.    He came to the emergency department on 2021 with complaints of left-sided sharp constant chest pain.  He was able to go to bed but woke up to sleep several times the pain.  Pain was associated with short windedness.  He arrived to the emergency department, he was anxious and hyperventilating.  Troponins have been negative x2, normal proBNP, creatinine on arrival was 3.94 is now 3.33, potassium 3.9, otherwise normal CMP, normal CBC, normal D-dimer.  No tox screen was run.  ECG showed normal sinus rhythm with inferior slight ST depression and T wave inversions as well as inferior Q waves.  Inferior ST changes are slightly better this morning.  Vitals have shown hypotension overnight with saturations 93% on room air, heart rate in the 80s and afebrile.  Medication at home was lisinopril hydrochlorothiazide.    We have been asked to see him for chest pain.    He works at [employer] and was with his sister, brother-in-law and nephew.  He says he has been short winded progressively for the last couple years without associated asthma.  In addition he has had intermittent left-sided chest pain for couple years but nothing it was as severe as what he had when he presented.  He has not felt his heart racing or skipping is had no dizziness or syncope.  Right now, at rest, he feels mildly short winded but has no chest pain.    Past Medical History:   Diagnosis Date   • Hypertension    •  "Hypoglycemia        History reviewed. No pertinent surgical history.      Prior to Admission medications    Medication Sig Start Date End Date Taking? Authorizing Provider   lisinopril-hydrochlorothiazide (PRINZIDE,ZESTORETIC) 20-25 MG per tablet Take 1 tablet by mouth Daily. 5/26/21 5/26/22 Yes Provider, MD Selma       No Known Allergies    Social History     Socioeconomic History   • Marital status: Single     Spouse name: Not on file   • Number of children: Not on file   • Years of education: Not on file   • Highest education level: Not on file   Tobacco Use   • Smoking status: Current Every Day Smoker     Packs/day: 2.00     Types: Cigarettes   • Smokeless tobacco: Never Used   Substance and Sexual Activity   • Alcohol use: Not Currently     Comment: occasionally   • Drug use: Never       History reviewed. No pertinent family history.    REVIEW OF SYSTEMS:   All systems reviewed.  Pertinent positives identified in HPI.  All other systems are negative.      Objective:     Vitals:    07/05/21 2000 07/05/21 2041 07/05/21 2300 07/06/21 0500   BP: (!) 87/61 100/68 108/65 105/63   BP Location:  Right arm Right arm Right arm   Patient Position:  Lying Lying Lying   Pulse: 71 75 80 67   Resp:  20 20 20   Temp:  98.6 °F (37 °C) 98.6 °F (37 °C) 98.7 °F (37.1 °C)   TempSrc:  Oral Oral Oral   SpO2: 97% 95% 93% 96%   Weight:  (!) 160 kg (352 lb 3.2 oz)     Height:  170.2 cm (67\")       Body mass index is 55.16 kg/m².    General Appearance:    Alert, cooperative, in no acute distress   Head:    Normocephalic, without obvious abnormality, atraumatic   Eyes:            Lids and lashes normal, conjunctivae and sclerae normal, no icterus, no pallor, corneas clear   Ears:    Ears appear intact with no abnormalities noted   Throat:   No oral lesions, no thrush, oral mucosa moist, poor dentition   Neck:   No adenopathy, supple, trachea midline, no thyromegaly, no carotid bruit, no JVD   Back:     No kyphosis present, no " scoliosis present, no skin lesions, erythema or scars, no tenderness to palpation, range of motion normal   Lungs:     Clear to auscultation, respirations regular, even and unlabored    Heart:    Regular rhythm and normal rate, normal S1 and S2, no murmur, no gallop, no rub, no click   Chest Wall:    No abnormalities observed   Abdomen:     Normal bowel sounds, no masses, no organomegaly, soft, nontender, nondistended, no guarding, no rebound  tenderness   Extremities:   Moves all extremities well, no edema, no cyanosis, no redness   Pulses:   Pulses palpable and equal bilaterally. Normal radial, carotid, dorsalis pedis and posterior tibial pulses bilaterally.    Skin:  Psychiatric:   No bleeding, bruising or rash    Alert and oriented x 3, normal mood and affect   Lab Review:     Results from last 7 days   Lab Units 07/06/21  0424   SODIUM mmol/L 141   POTASSIUM mmol/L 3.9   CHLORIDE mmol/L 103   CO2 mmol/L 27.5   BUN mg/dL 45*   CREATININE mg/dL 3.33*   CALCIUM mg/dL 9.7   BILIRUBIN mg/dL 0.5   ALK PHOS U/L 53   ALT (SGPT) U/L 13   AST (SGOT) U/L 19   GLUCOSE mg/dL 116*     Results from last 7 days   Lab Units 07/06/21  0424 07/05/21  1732   TROPONIN T ng/mL <0.010 <0.010     Results from last 7 days   Lab Units 07/06/21  0424   WBC 10*3/mm3 7.91   HEMOGLOBIN g/dL 12.6*   HEMATOCRIT % 40.9   PLATELETS 10*3/mm3 254     Results from last 7 days   Lab Units 07/05/21  1732   INR  1.11*   APTT seconds 26.8                                     I personally viewed and interpreted the patient's EKG/Telemetry data.        Assessment and Plan:       1. Chest pain, no ACS but has risk factors and mildly abnormal ECG.  Set up stress study, may have to be a 2-day stress because of his obesity.  Will discuss stress images done today also check echocardiogram.  2. MIGUELINA, getting IV fluid and improving  3. Morbid obesity  4. Tobacco use  5. History of hypertension, now hypotensive.    Echo today.  Stress today, may have to be today  given morbid obesity.  We will do stress images today.  Hold lisinopril hydrochlorothiazide due to MIGUELINA and hypotension.    Li Gomez MD  07/06/21  07:12 EDT

## 2021-07-07 ENCOUNTER — APPOINTMENT (OUTPATIENT)
Dept: ULTRASOUND IMAGING | Facility: HOSPITAL | Age: 46
End: 2021-07-07

## 2021-07-07 LAB
ALBUMIN SERPL-MCNC: 4 G/DL (ref 3.5–5.2)
ANION GAP SERPL CALCULATED.3IONS-SCNC: 15.6 MMOL/L (ref 5–15)
BH CV NUCLEAR PRIOR STUDY: 3
BH CV REST NUCLEAR ISOTOPE DOSE: 34.2 MCI
BH CV STRESS BP STAGE 1: NORMAL
BH CV STRESS COMMENTS STAGE 1: NORMAL
BH CV STRESS DOSE REGADENOSON STAGE 1: 0.4
BH CV STRESS DURATION MIN STAGE 1: 0
BH CV STRESS DURATION SEC STAGE 1: 30
BH CV STRESS HR STAGE 1: 60
BH CV STRESS NUCLEAR ISOTOPE DOSE: 32.6 MCI
BH CV STRESS O2 STAGE 1: 95
BH CV STRESS PROTOCOL 1: NORMAL
BH CV STRESS RECOVERY BP: NORMAL MMHG
BH CV STRESS RECOVERY HR: 78 BPM
BH CV STRESS RECOVERY O2: 91 %
BH CV STRESS STAGE 1: 1
BILIRUB UR QL STRIP: NEGATIVE
BUN SERPL-MCNC: 47 MG/DL (ref 6–20)
BUN/CREAT SERPL: 13.6 (ref 7–25)
CALCIUM SPEC-SCNC: 9.9 MG/DL (ref 8.6–10.5)
CHLORIDE SERPL-SCNC: 103 MMOL/L (ref 98–107)
CHOLEST SERPL-MCNC: 155 MG/DL (ref 0–200)
CLARITY UR: CLEAR
CO2 SERPL-SCNC: 23.4 MMOL/L (ref 22–29)
COLOR UR: YELLOW
CREAT SERPL-MCNC: 3.45 MG/DL (ref 0.76–1.27)
CREAT UR-MCNC: 53 MG/DL
DEPRECATED RDW RBC AUTO: 49.7 FL (ref 37–54)
ERYTHROCYTE [DISTWIDTH] IN BLOOD BY AUTOMATED COUNT: 14.2 % (ref 12.3–15.4)
GFR SERPL CREATININE-BSD FRML MDRD: 19 ML/MIN/1.73
GLUCOSE SERPL-MCNC: 89 MG/DL (ref 65–99)
GLUCOSE UR STRIP-MCNC: NEGATIVE MG/DL
HBA1C MFR BLD: 5.5 % (ref 4.8–5.6)
HCT VFR BLD AUTO: 42.7 % (ref 37.5–51)
HDLC SERPL-MCNC: 25 MG/DL (ref 40–60)
HGB BLD-MCNC: 13.2 G/DL (ref 13–17.7)
HGB UR QL STRIP.AUTO: NEGATIVE
KETONES UR QL STRIP: NEGATIVE
LDLC SERPL CALC-MCNC: 100 MG/DL (ref 0–100)
LDLC/HDLC SERPL: 3.86 {RATIO}
LEUKOCYTE ESTERASE UR QL STRIP.AUTO: NEGATIVE
LV EF NUC BP: 55 %
MAXIMAL PREDICTED HEART RATE: 176 BPM
MCH RBC QN AUTO: 29.3 PG (ref 26.6–33)
MCHC RBC AUTO-ENTMCNC: 30.9 G/DL (ref 31.5–35.7)
MCV RBC AUTO: 94.9 FL (ref 79–97)
NITRITE UR QL STRIP: NEGATIVE
PERCENT MAX PREDICTED HR: 54.55 %
PH UR STRIP.AUTO: <=5 [PH] (ref 4.5–8)
PHOSPHATE SERPL-MCNC: 6.9 MG/DL (ref 2.5–4.5)
PLATELET # BLD AUTO: 253 10*3/MM3 (ref 140–450)
PMV BLD AUTO: 9.9 FL (ref 6–12)
POTASSIUM SERPL-SCNC: 4.2 MMOL/L (ref 3.5–5.2)
PROT UR QL STRIP: NEGATIVE
RBC # BLD AUTO: 4.5 10*6/MM3 (ref 4.14–5.8)
SODIUM SERPL-SCNC: 142 MMOL/L (ref 136–145)
SODIUM UR-SCNC: 111 MMOL/L
SP GR UR STRIP: 1.02 (ref 1–1.03)
STRESS BASELINE BP: NORMAL MMHG
STRESS BASELINE HR: 56 BPM
STRESS O2 SAT REST: 95 %
STRESS PERCENT HR: 64 %
STRESS POST ESTIMATED WORKLOAD: 1 METS
STRESS POST EXERCISE DUR SEC: 30 SEC
STRESS POST O2 SAT PEAK: 100 %
STRESS POST PEAK BP: NORMAL MMHG
STRESS POST PEAK HR: 96 BPM
STRESS TARGET HR: 150 BPM
TRIGL SERPL-MCNC: 167 MG/DL (ref 0–150)
TSH SERPL DL<=0.05 MIU/L-ACNC: 1.3 UIU/ML (ref 0.27–4.2)
UROBILINOGEN UR QL STRIP: NORMAL
VLDLC SERPL-MCNC: 30 MG/DL (ref 5–40)
WBC # BLD AUTO: 7.11 10*3/MM3 (ref 3.4–10.8)

## 2021-07-07 PROCEDURE — 51798 US URINE CAPACITY MEASURE: CPT

## 2021-07-07 PROCEDURE — 0 TECHNETIUM SESTAMIBI: Performed by: INTERNAL MEDICINE

## 2021-07-07 PROCEDURE — 81003 URINALYSIS AUTO W/O SCOPE: CPT | Performed by: NURSE PRACTITIONER

## 2021-07-07 PROCEDURE — A9500 TC99M SESTAMIBI: HCPCS | Performed by: INTERNAL MEDICINE

## 2021-07-07 PROCEDURE — 99225 PR SBSQ OBSERVATION CARE/DAY 25 MINUTES: CPT | Performed by: HOSPITALIST

## 2021-07-07 PROCEDURE — G0378 HOSPITAL OBSERVATION PER HR: HCPCS

## 2021-07-07 PROCEDURE — 96361 HYDRATE IV INFUSION ADD-ON: CPT

## 2021-07-07 PROCEDURE — 84300 ASSAY OF URINE SODIUM: CPT | Performed by: NURSE PRACTITIONER

## 2021-07-07 PROCEDURE — 99214 OFFICE O/P EST MOD 30 MIN: CPT | Performed by: NURSE PRACTITIONER

## 2021-07-07 PROCEDURE — 80069 RENAL FUNCTION PANEL: CPT | Performed by: HOSPITALIST

## 2021-07-07 PROCEDURE — 80061 LIPID PANEL: CPT | Performed by: NURSE PRACTITIONER

## 2021-07-07 PROCEDURE — 82570 ASSAY OF URINE CREATININE: CPT | Performed by: HOSPITALIST

## 2021-07-07 PROCEDURE — 76775 US EXAM ABDO BACK WALL LIM: CPT

## 2021-07-07 PROCEDURE — 85027 COMPLETE CBC AUTOMATED: CPT | Performed by: NURSE PRACTITIONER

## 2021-07-07 RX ORDER — NICOTINE 21 MG/24HR
1 PATCH, TRANSDERMAL 24 HOURS TRANSDERMAL
Status: DISCONTINUED | OUTPATIENT
Start: 2021-07-07 | End: 2021-07-08 | Stop reason: HOSPADM

## 2021-07-07 RX ADMIN — SODIUM CHLORIDE 125 ML/HR: 9 INJECTION, SOLUTION INTRAVENOUS at 10:57

## 2021-07-07 RX ADMIN — Medication 1 PATCH: at 17:27

## 2021-07-07 RX ADMIN — TECHNETIUM TC 99M SESTAMIBI 1 DOSE: 1 INJECTION INTRAVENOUS at 07:23

## 2021-07-07 RX ADMIN — SODIUM CHLORIDE 125 ML/HR: 9 INJECTION, SOLUTION INTRAVENOUS at 04:23

## 2021-07-07 RX ADMIN — SODIUM CHLORIDE 125 ML/HR: 9 INJECTION, SOLUTION INTRAVENOUS at 19:09

## 2021-07-07 NOTE — PAYOR COMM NOTE
"Casandra Don GIORDANO (46 y.o. Male)     ATTN: NURSE REVIEWER  RE: UPDATED CLINICALS FOR OBSERVATION   REF#[id number]  PLS REPLY TO GEENA EDUARDO [phone]  FAX# [phone]      Date of Birth Social Security Number Address Home Phone MRN    1975  [address] [place] [address] [phone] [id number]    [facility] Marital Status          None Single       Admission Date Admission Type Admitting Provider Attending Provider Department, Room/Bed    7/5/21 Emergency Oh Warner MD Keith, Matthew Cody Lee, MD [facility] MED SURG, 1417/1    Discharge Date Discharge Disposition Discharge Destination                       Attending Provider: Oh Warner MD    Allergies: No Known Allergies    Isolation: None   Infection: None   Code Status: CPR    Ht: 170 cm (66.93\")   Wt: 160 kg (352 lb 11.8 oz)    Admission Cmt: None   Principal Problem: None                Active Insurance as of 7/5/2021     Primary Coverage     Payor Plan Insurance Group Employer/Plan Group    [employer] [place] [employer] [place]ICA[place]      Payor Plan Address Payor Plan Phone Number Payor Plan Fax Number Effective Dates    [address] [address] [phone]  7/5/2021 - None Entered    [facility] [address]       Subscriber Name Subscriber Birth Date Member ID       DON CARRERA 1975 [id number]                 Emergency Contacts      (Rel.) Home Phone Work Phone Mobile Phone    Ro Carrera (Other) [phone] -- --    VIDHYA CARRERA (Other) [phone] --            Vital Signs (last day)     Date/Time   Temp   Temp src   Pulse   Resp   BP   Patient Position   SpO2    07/07/21 1434   98.4 (36.9)   Oral   73   17   124/72   Sitting   94    07/07/21 1140   98.3 (36.8)   Oral   60   17   121/71   Sitting   96    07/07/21 0500   98.2 (36.8)   Oral   56   18   105/71   Sitting   99    07/07/21 0423   --   --   77   18   --   --   95    07/06/21 2300   98.1 (36.7)   Oral   65   18   " 137/60   Sitting   98    07/06/21 1900   98.9 (37.2)   Oral   70   16   104/69   Sitting   94    07/06/21 1500   98.9 (37.2)   Oral   60   16   116/69   Sitting   98    07/06/21 0832   --   --   --   --   105/63   --   --    07/06/21 0500   98.7 (37.1)   Oral   67   20   105/63   Lying   96                Current Facility-Administered Medications   Medication Dose Route Frequency Provider Last Rate Last Admin   • nitroglycerin (NITROSTAT) SL tablet 0.4 mg  0.4 mg Sublingual Q5 Min PRN Aden Dyson MD   0.4 mg at 07/05/21 1753   • sodium chloride 0.9 % flush 10 mL  10 mL Intravenous PRN Aden Dyson MD       • sodium chloride 0.9 % infusion  125 mL/hr Intravenous Continuous Aden Dyson  mL/hr at 07/07/21 1315 125 mL/hr at 07/07/21 1315     Blood Administration Record (From admission, onward)    None          Lab Results (last 24 hours)     Procedure Component Value Units Date/Time    Creatinine, Urine, Random - Urine, Clean Catch [[id number]] Collected: 07/07/21 0654    Specimen: Urine, Clean Catch Updated: 07/07/21 1207    Renal Function Panel [[id number]]  (Abnormal) Collected: 07/07/21 0703    Specimen: Blood Updated: 07/07/21 1020     Glucose 89 mg/dL      BUN 47 mg/dL      Creatinine 3.45 mg/dL      Sodium 142 mmol/L      Potassium 4.2 mmol/L      Chloride 103 mmol/L      CO2 23.4 mmol/L      Calcium 9.9 mg/dL      Albumin 4.00 g/dL      Phosphorus 6.9 mg/dL      Anion Gap 15.6 mmol/L      BUN/Creatinine Ratio 13.6     eGFR Non African Amer 19 mL/min/1.73     Narrative:      GFR Normal >60  Chronic Kidney Disease <60  Kidney Failure <15      Lipid Panel [[id number]]  (Abnormal) Collected: 07/07/21 0703    Specimen: Blood Updated: 07/07/21 0752     Total Cholesterol 155 mg/dL      Triglycerides 167 mg/dL      HDL Cholesterol 25 mg/dL      LDL Cholesterol  100 mg/dL      VLDL Cholesterol 30 mg/dL      LDL/HDL Ratio 3.86    Narrative:      Cholesterol Reference Ranges  (U.S. Department of  Health and Human Services ATP III Classifications)    Desirable          <200 mg/dL  Borderline High    200-239 mg/dL  High Risk          >240 mg/dL      Triglyceride Reference Ranges  (U.S. Department of Health and Human Services ATP III Classifications)    Normal           <150 mg/dL  Borderline High  150-199 mg/dL  High             200-499 mg/dL  Very High        >500 mg/dL    HDL Reference Ranges  (U.S. Department of Health and Human Services ATP III Classifcations)    Low     <40 mg/dl (major risk factor for CHD)  High    >60 mg/dl ('negative' risk factor for CHD)        LDL Reference Ranges  (U.S. Department of Health and Human Services ATP III Classifcations)    Optimal          <100 mg/dL  Near Optimal     100-129 mg/dL  Borderline High  130-159 mg/dL  High             160-189 mg/dL  Very High        >189 mg/dL    TSH [[id number]]  (Normal) Collected: 07/06/21 0424    Specimen: Blood Updated: 07/07/21 0748     TSH 1.300 uIU/mL     Hemoglobin A1c [[id number]]  (Normal) Collected: 07/06/21 0424    Specimen: Blood Updated: 07/07/21 0745     Hemoglobin A1C 5.50 %     Narrative:      Hemoglobin A1C Ranges:    Increased Risk for Diabetes  5.7% to 6.4%  Diabetes                     >= 6.5%  Diabetic Goal                < 7.0%    Sodium, Urine, Random - Urine, Clean Catch [[id number]] Collected: 07/07/21 0654    Specimen: Urine, Clean Catch Updated: 07/07/21 0739     Sodium, Urine 111 mmol/L     Narrative:      Reference intervals for random urine have not been established.  Clinical usage is dependent upon physician's interpretation in combination with other laboratory tests.       CBC (No Diff) [[id number]]  (Abnormal) Collected: 07/07/21 0703    Specimen: Blood Updated: 07/07/21 0736     WBC 7.11 10*3/mm3      RBC 4.50 10*6/mm3      Hemoglobin 13.2 g/dL      Hematocrit 42.7 %      MCV 94.9 fL      MCH 29.3 pg      MCHC 30.9 g/dL      RDW 14.2 %      RDW-SD 49.7 fl      MPV 9.9 fL      Platelets 253 10*3/mm3      Urinalysis With Culture If Indicated - Urine, Clean Catch [[id number]]  (Normal) Collected: 21 0654    Specimen: Urine, Clean Catch Updated: 21 0734     Color, UA Yellow     Appearance, UA Clear     pH, UA <=5.0     Specific Gravity, UA 1.020     Glucose, UA Negative     Ketones, UA Negative     Bilirubin, UA Negative     Blood, UA Negative     Protein, UA Negative     Leuk Esterase, UA Negative     Nitrite, UA Negative     Urobilinogen, UA 0.2 E.U./dL    Narrative:      Urine microscopic not indicated.        Imaging Results (Last 24 Hours)     Procedure Component Value Units Date/Time    US Renal Bilateral [[id number]] Collected: 21 124     Updated: 21    Narrative:      BILATERAL RENAL ULTRASOUND, 2021     HISTORY:  Elevated BUN/creatinine today     COMPARISON:  None     TECHNIQUE:  Grayscale ultrasound imaging of both kidneys and the urinary bladder was  performed.     FINDINGS:  Both kidneys appear normal without cyst, mass or hydronephrosis.. .  Right Kidney is  12.5 cms, Left kidney is 10.7 cms.     The urinary bladder is within normal limits       Impression:      Negative bilateral renal  ultarsound. Exam is somewhat limited by  patient's size     This report was finalized on 2021 12:45 PM by Dr. Sammy Marrero MD.           ECG/EMG Results (last 24 hours)     ** No results found for the last 24 hours. **        Operative/Procedure Notes (last 24 hours) (Notes from 21 1512 through 21 1512)    No notes of this type exist for this encounter.            Physician Progress Notes (last 24 hours) (Notes from 21 1530 through 21 1530)      Milagro Mccloud APRN at 21 0806     Attestation signed by Li Gomez MD at 21 0999    I have reviewed this documentation and agree.                        Patient Name: Don Guajardo  :1975  46 y.o.      Patient Care Team:  Deana Mcconnell MD as PCP - General (Internal  "Medicine)    Chief Complaint:  Chest pain    Interval History: Just returned from 2nf day of stress test.  States it made him a little dizzy.  Denies angina/chest pain.  Mildly SOA with activity when up in room.       Objective   Vital Signs  Temp:  [98.1 °F (36.7 °C)-98.9 °F (37.2 °C)] 98.2 °F (36.8 °C)  Heart Rate:  [56-77] 56  Resp:  [16-18] 18  BP: (104-137)/(60-71) 105/71    Intake/Output Summary (Last 24 hours) at 7/7/2021 0807  Last data filed at 7/7/2021 0653  Gross per 24 hour   Intake 4235.41 ml   Output 4251 ml   Net -15.59 ml     Flowsheet Rows      First Filed Value   Admission Height  170.2 cm (67\") Documented at 07/05/2021 1711   Admission Weight  (!) 157 kg (347 lb) Documented at 07/05/2021 1711          Physical Exam:   General Appearance:    Alert, cooperative, in no acute distress   Lungs:     Clear to auscultation.  Normal respiratory effort and mildly increased rate.      Heart:    Regular rhythm and normal rate, normal S1 and S2, no murmurs, gallops or rubs.     Chest Wall:    No abnormalities observed   Abdomen:     Soft, nontender, positive bowel sounds.     Extremities:   no cyanosis, clubbing.  Mild LE edema.  No marked joint deformities.  Adequate musculoskeletal strength.       Results Review:    Results from last 7 days   Lab Units 07/06/21  0424   SODIUM mmol/L 141   POTASSIUM mmol/L 3.9   CHLORIDE mmol/L 103   CO2 mmol/L 27.5   BUN mg/dL 45*   CREATININE mg/dL 3.33*   GLUCOSE mg/dL 116*   CALCIUM mg/dL 9.7     Results from last 7 days   Lab Units 07/06/21  0424 07/05/21  1732   TROPONIN T ng/mL <0.010 <0.010     Results from last 7 days   Lab Units 07/07/21  0703   WBC 10*3/mm3 7.11   HEMOGLOBIN g/dL 13.2   HEMATOCRIT % 42.7   PLATELETS 10*3/mm3 253     Results from last 7 days   Lab Units 07/05/21  1732   INR  1.11*   APTT seconds 26.8         Results from last 7 days   Lab Units 07/07/21  0703   CHOLESTEROL mg/dL 155   TRIGLYCERIDES mg/dL 167*   HDL CHOL mg/dL 25*   LDL CHOL mg/dL " 100               Medication Review:         sodium chloride, 125 mL/hr, Last Rate: 125 mL/hr (07/07/21 0640)        Assessment/Plan     1. Chest pain, no ACS but has risk factors and mildly abnormal ECG.  Echo with :  · Estimated left ventricular EF = 55% Left ventricular systolic function is normal. Normal left ventricular cavity size and wall thickness noted. All left ventricular wall segments contract normally. Left ventricular diastolic function is consistent with (grade II w/high LAP) pseudonormalization.  · Limited 2D and Doppler imaging of cardiac valves with no obvious valve dysfunction..    2. MIGUELINA, getting IV fluid.  BMP not performed today, creatinine 3.33 7/6/21  3. Morbid obesity - he would benefit from a weight loss program.   4. Tobacco use - cessation advised  5. History of hypertension, now hypotensive. Systolic remains above 100.    Await results of stress test.     CHILO Randolph  [facility]  07/07/21  08:07 EDT        Electronically signed by Li Gomez MD at 07/07/21 0998     Mercedes Henry APRN at 07/07/21 0651        Creatinine still high, check bladder scan, renal ultrasound, ua/urine sodium, continue IV hydration, check BMP/CBC now.    Electronically signed by Mercedes Henry APRN at 07/07/21 0652       Consult Notes (last 24 hours) (Notes from 07/06/21 1530 through 07/07/21 1530)    No notes of this type exist for this encounter.

## 2021-07-07 NOTE — PROGRESS NOTES
"    Patient Name: Don Guajardo  :1975  46 y.o.      Patient Care Team:  Deana Mcconnell MD as PCP - General (Internal Medicine)    Chief Complaint:  Chest pain    Interval History: Just returned from 2nf day of stress test.  States it made him a little dizzy.  Denies angina/chest pain.  Mildly SOA with activity when up in room.       Objective   Vital Signs  Temp:  [98.1 °F (36.7 °C)-98.9 °F (37.2 °C)] 98.2 °F (36.8 °C)  Heart Rate:  [56-77] 56  Resp:  [16-18] 18  BP: (104-137)/(60-71) 105/71    Intake/Output Summary (Last 24 hours) at 2021 0807  Last data filed at 2021 0653  Gross per 24 hour   Intake 4235.41 ml   Output 4251 ml   Net -15.59 ml     Flowsheet Rows      First Filed Value   Admission Height  170.2 cm (67\") Documented at 2021 1711   Admission Weight  (!) 157 kg (347 lb) Documented at 2021 1711          Physical Exam:   General Appearance:    Alert, cooperative, in no acute distress   Lungs:     Clear to auscultation.  Normal respiratory effort and mildly increased rate.      Heart:    Regular rhythm and normal rate, normal S1 and S2, no murmurs, gallops or rubs.     Chest Wall:    No abnormalities observed   Abdomen:     Soft, nontender, positive bowel sounds.     Extremities:   no cyanosis, clubbing.  Mild LE edema.  No marked joint deformities.  Adequate musculoskeletal strength.       Results Review:    Results from last 7 days   Lab Units 21  0424   SODIUM mmol/L 141   POTASSIUM mmol/L 3.9   CHLORIDE mmol/L 103   CO2 mmol/L 27.5   BUN mg/dL 45*   CREATININE mg/dL 3.33*   GLUCOSE mg/dL 116*   CALCIUM mg/dL 9.7     Results from last 7 days   Lab Units 21  0424 21  1732   TROPONIN T ng/mL <0.010 <0.010     Results from last 7 days   Lab Units 21  0703   WBC 10*3/mm3 7.11   HEMOGLOBIN g/dL 13.2   HEMATOCRIT % 42.7   PLATELETS 10*3/mm3 253     Results from last 7 days   Lab Units 21  1732   INR  1.11*   APTT seconds 26.8         Results " from last 7 days   Lab Units 07/07/21  0703   CHOLESTEROL mg/dL 155   TRIGLYCERIDES mg/dL 167*   HDL CHOL mg/dL 25*   LDL CHOL mg/dL 100               Medication Review:         sodium chloride, 125 mL/hr, Last Rate: 125 mL/hr (07/07/21 0640)        Assessment/Plan     1. Chest pain, no ACS but has risk factors and mildly abnormal ECG.  Echo with :  · Estimated left ventricular EF = 55% Left ventricular systolic function is normal. Normal left ventricular cavity size and wall thickness noted. All left ventricular wall segments contract normally. Left ventricular diastolic function is consistent with (grade II w/high LAP) pseudonormalization.  · Limited 2D and Doppler imaging of cardiac valves with no obvious valve dysfunction..    2. MIGUELINA, getting IV fluid.  BMP not performed today, creatinine 3.33 7/6/21  3. Morbid obesity - he would benefit from a weight loss program.   4. Tobacco use - cessation advised  5. History of hypertension, now hypotensive. Systolic remains above 100.    Await results of stress test.     CHILO Randolph  [facility]  07/07/21  08:07 EDT

## 2021-07-07 NOTE — PROGRESS NOTES
"Hospitalist Team      Patient Care Team:  Deana Mcconnell MD as PCP - General (Internal Medicine)        Chief Complaint: Follow-up Acute Renal Failure    Subjective    No further chest pain, but does not some dizziness on occasion.  Urinating frequently.  He denies dyspnea.  Tolerating PO.    Objective    Vital Signs  Temp:  [98.1 °F (36.7 °C)-98.9 °F (37.2 °C)] 98.4 °F (36.9 °C)  Heart Rate:  [56-77] 73  Resp:  [16-18] 17  BP: (104-137)/(60-72) 124/72  Oxygen Therapy  SpO2: 94 %  Pulse Oximetry Type: Intermittent  Device (Oxygen Therapy): room air}    Flowsheet Rows      First Filed Value   Admission Height  170.2 cm (67\") Documented at 07/05/2021 1711   Admission Weight  (!) 157 kg (347 lb) Documented at 07/05/2021 1711        Physical Exam:    General: Appears older than stated age in NAD.  Lungs: Breath sounds are diminished.  No wheeze or rales noted.  No accessory use noted.  CV: Heart sounds are distant but regular.  I do not appreciate any murmurs.  Abdomen: Super-morbidly obese, soft, and non-tender w/ active bowel sounds.  MSK: No C/C/E  Neuro: CN II-XII grossly intact.  Psych: Normal affect.    Results Review:     I reviewed the patient's new clinical results.    Lab Results (last 24 hours)     Procedure Component Value Units Date/Time    Creatinine, Urine, Random - Urine, Clean Catch [[id number]] Collected: 07/07/21 0654    Specimen: Urine, Clean Catch Updated: 07/07/21 1207    Renal Function Panel [[id number]]  (Abnormal) Collected: 07/07/21 0703    Specimen: Blood Updated: 07/07/21 1020     Glucose 89 mg/dL      BUN 47 mg/dL      Creatinine 3.45 mg/dL      Sodium 142 mmol/L      Potassium 4.2 mmol/L      Chloride 103 mmol/L      CO2 23.4 mmol/L      Calcium 9.9 mg/dL      Albumin 4.00 g/dL      Phosphorus 6.9 mg/dL      Anion Gap 15.6 mmol/L      BUN/Creatinine Ratio 13.6     eGFR Non African Amer 19 mL/min/1.73     Narrative:      GFR Normal >60  Chronic Kidney Disease <60  Kidney Failure <15   "    Lipid Panel [[id number]]  (Abnormal) Collected: 07/07/21 0703    Specimen: Blood Updated: 07/07/21 0752     Total Cholesterol 155 mg/dL      Triglycerides 167 mg/dL      HDL Cholesterol 25 mg/dL      LDL Cholesterol  100 mg/dL      VLDL Cholesterol 30 mg/dL      LDL/HDL Ratio 3.86    Narrative:      Cholesterol Reference Ranges  (U.S. Department of Health and Human Services ATP III Classifications)    Desirable          <200 mg/dL  Borderline High    200-239 mg/dL  High Risk          >240 mg/dL      Triglyceride Reference Ranges  (U.S. Department of Health and Human Services ATP III Classifications)    Normal           <150 mg/dL  Borderline High  150-199 mg/dL  High             200-499 mg/dL  Very High        >500 mg/dL    HDL Reference Ranges  (U.S. Department of Health and Human Services ATP III Classifcations)    Low     <40 mg/dl (major risk factor for CHD)  High    >60 mg/dl ('negative' risk factor for CHD)        LDL Reference Ranges  (U.S. Department of Health and Human Services ATP III Classifcations)    Optimal          <100 mg/dL  Near Optimal     100-129 mg/dL  Borderline High  130-159 mg/dL  High             160-189 mg/dL  Very High        >189 mg/dL    TSH [[id number]]  (Normal) Collected: 07/06/21 0424    Specimen: Blood Updated: 07/07/21 0748     TSH 1.300 uIU/mL     Hemoglobin A1c [[id number]]  (Normal) Collected: 07/06/21 0424    Specimen: Blood Updated: 07/07/21 0745     Hemoglobin A1C 5.50 %     Narrative:      Hemoglobin A1C Ranges:    Increased Risk for Diabetes  5.7% to 6.4%  Diabetes                     >= 6.5%  Diabetic Goal                < 7.0%    Sodium, Urine, Random - Urine, Clean Catch [[id number]] Collected: 07/07/21 0654    Specimen: Urine, Clean Catch Updated: 07/07/21 0739     Sodium, Urine 111 mmol/L     Narrative:      Reference intervals for random urine have not been established.  Clinical usage is dependent upon physician's interpretation in combination with other laboratory  tests.       CBC (No Diff) [[id number]]  (Abnormal) Collected: 07/07/21 0703    Specimen: Blood Updated: 07/07/21 0736     WBC 7.11 10*3/mm3      RBC 4.50 10*6/mm3      Hemoglobin 13.2 g/dL      Hematocrit 42.7 %      MCV 94.9 fL      MCH 29.3 pg      MCHC 30.9 g/dL      RDW 14.2 %      RDW-SD 49.7 fl      MPV 9.9 fL      Platelets 253 10*3/mm3     Urinalysis With Culture If Indicated - Urine, Clean Catch [[id number]]  (Normal) Collected: 07/07/21 0654    Specimen: Urine, Clean Catch Updated: 07/07/21 0734     Color, UA Yellow     Appearance, UA Clear     pH, UA <=5.0     Specific Gravity, UA 1.020     Glucose, UA Negative     Ketones, UA Negative     Bilirubin, UA Negative     Blood, UA Negative     Protein, UA Negative     Leuk Esterase, UA Negative     Nitrite, UA Negative     Urobilinogen, UA 0.2 E.U./dL    Narrative:      Urine microscopic not indicated.          Imaging Results (Last 24 Hours)     Procedure Component Value Units Date/Time    US Renal Bilateral [[id number]] Collected: 07/07/21 1244     Updated: 07/07/21 1247    Narrative:      BILATERAL RENAL ULTRASOUND, 07/07/2021     HISTORY:  Elevated BUN/creatinine today     COMPARISON:  None     TECHNIQUE:  Grayscale ultrasound imaging of both kidneys and the urinary bladder was  performed.     FINDINGS:  Both kidneys appear normal without cyst, mass or hydronephrosis.. .  Right Kidney is  12.5 cms, Left kidney is 10.7 cms.     The urinary bladder is within normal limits       Impression:      Negative bilateral renal  ultarsound. Exam is somewhat limited by  patient's size     This report was finalized on 7/7/2021 12:45 PM by Dr. Sammy Marrero MD.             Xray reviewed personally by physician.      Medication Review:   I have reviewed the patient's current medication list    Current Facility-Administered Medications:   •  nitroglycerin (NITROSTAT) SL tablet 0.4 mg, 0.4 mg, Sublingual, Q5 Min PRN, Aden Dyson MD, 0.4 mg at 07/05/21 0801  •   [COMPLETED] Insert peripheral IV, , , Once **AND** sodium chloride 0.9 % flush 10 mL, 10 mL, Intravenous, PRN, Aden Dyson MD  •  sodium chloride 0.9 % infusion, 125 mL/hr, Intravenous, Continuous, Aden Dyson MD, Last Rate: 125 mL/hr at 07/07/21 1522, 125 mL/hr at 07/07/21 1522      Assessment/Plan     1.  Chest Pain: reviewed stress test results w/ patient.  Encouraged tobacco cessation.    2.  Acute Renal Failure: Creatinine unchanged.  Urine creatinine pending.  U/S negative.  Strangely, UA completely normal w/ any sediment.  Will continue IVF and monitor.  UOP good at 4.5L yesterday.    3.  Tobacco abuse: Add Nicoderm.    Plan for disposition: Predicated on hospital course.    Rodolfo Trejo MD  07/07/21  16:11 EDT

## 2021-07-07 NOTE — PLAN OF CARE
Goal Outcome Evaluation:  Plan of Care Reviewed With: patient        Progress: improving  Outcome Summary: Pt VSS, am labs pending. Pt continues tele. SR, HR 60-70's. Pt ambulating in room, voiding and BM overnight. Pt denies pain, n/v/d overnight. Pt has been NPO since MN for second part of stress test in am.

## 2021-07-07 NOTE — PROGRESS NOTES
Creatinine still high, check bladder scan, renal ultrasound, ua/urine sodium, continue IV hydration, check BMP/CBC now.

## 2021-07-07 NOTE — PLAN OF CARE
Goal Outcome Evaluation:  Plan of Care Reviewed With: patient        Progress: improving  Outcome Summary: VSS,  on ra, NS at 125 ml/hr.up ad charly ambulating about room, up to chair most of shift, denies pain/discomfort, telemetry discontinued.

## 2021-07-08 VITALS
HEART RATE: 67 BPM | RESPIRATION RATE: 17 BRPM | WEIGHT: 315 LBS | DIASTOLIC BLOOD PRESSURE: 87 MMHG | OXYGEN SATURATION: 95 % | SYSTOLIC BLOOD PRESSURE: 137 MMHG | BODY MASS INDEX: 49.44 KG/M2 | TEMPERATURE: 97.2 F | HEIGHT: 67 IN

## 2021-07-08 LAB
ALBUMIN SERPL-MCNC: 3.8 G/DL (ref 3.5–5.2)
ANION GAP SERPL CALCULATED.3IONS-SCNC: 5.9 MMOL/L (ref 5–15)
BUN SERPL-MCNC: 25 MG/DL (ref 6–20)
BUN/CREAT SERPL: 26.9 (ref 7–25)
CALCIUM SPEC-SCNC: 9.4 MG/DL (ref 8.6–10.5)
CHLORIDE SERPL-SCNC: 103 MMOL/L (ref 98–107)
CO2 SERPL-SCNC: 28.1 MMOL/L (ref 22–29)
CREAT SERPL-MCNC: 0.93 MG/DL (ref 0.76–1.27)
GFR SERPL CREATININE-BSD FRML MDRD: 88 ML/MIN/1.73
GLUCOSE SERPL-MCNC: 107 MG/DL (ref 65–99)
PHOSPHATE SERPL-MCNC: 2.9 MG/DL (ref 2.5–4.5)
POTASSIUM SERPL-SCNC: 4.7 MMOL/L (ref 3.5–5.2)
SODIUM SERPL-SCNC: 137 MMOL/L (ref 136–145)

## 2021-07-08 PROCEDURE — G0378 HOSPITAL OBSERVATION PER HR: HCPCS

## 2021-07-08 PROCEDURE — 80069 RENAL FUNCTION PANEL: CPT | Performed by: HOSPITALIST

## 2021-07-08 PROCEDURE — 96361 HYDRATE IV INFUSION ADD-ON: CPT

## 2021-07-08 PROCEDURE — 99217 PR OBSERVATION CARE DISCHARGE MANAGEMENT: CPT | Performed by: HOSPITALIST

## 2021-07-08 RX ORDER — ROSUVASTATIN CALCIUM 5 MG/1
5 TABLET, COATED ORAL NIGHTLY
Qty: 30 TABLET | Refills: 0 | Status: SHIPPED | OUTPATIENT
Start: 2021-07-08 | End: 2021-08-07

## 2021-07-08 RX ORDER — NICOTINE 21 MG/24HR
1 PATCH, TRANSDERMAL 24 HOURS TRANSDERMAL
Start: 2021-07-08

## 2021-07-08 RX ADMIN — SODIUM CHLORIDE 125 ML/HR: 9 INJECTION, SOLUTION INTRAVENOUS at 03:09

## 2021-07-08 RX ADMIN — Medication 1 PATCH: at 09:01

## 2021-07-08 NOTE — PLAN OF CARE
Goal Outcome Evaluation:              Outcome Summary: VS stable, r/a. IV fluids continue as ordered. Sleeps in chair all night which he says is his normal. Voiding per urinal. Continue to assess.

## 2021-07-08 NOTE — DISCHARGE INSTR - APPOINTMENTS
Unsuccessful attempts to schedule appointment with pt's PCP.  Pt to schedule own follow-up appointment.

## 2021-07-09 ENCOUNTER — READMISSION MANAGEMENT (OUTPATIENT)
Dept: CALL CENTER | Facility: HOSPITAL | Age: 46
End: 2021-07-09

## 2021-07-09 NOTE — CASE MANAGEMENT/SOCIAL WORK
Case Management Discharge Note      Final Note: D/C home    Provided Post Acute Provider List?: Refused  Refused Provider List Comment: Pt declined  Provided Post Acute Provider Quality & Resource List?: Refused  Refused Quality and Resource List Comment: Pt declined    Selected Continued Care - Discharged on 7/8/2021 Admission date: 7/5/2021 - Discharge disposition: Home or Self Care    Destination    No services have been selected for the patient.              Durable Medical Equipment    No services have been selected for the patient.              Dialysis/Infusion    No services have been selected for the patient.              Home Medical Care    No services have been selected for the patient.              Therapy    No services have been selected for the patient.              Community Resources    No services have been selected for the patient.              Community & DME    No services have been selected for the patient.                       Final Discharge Disposition Code: 01 - home or self-care

## 2021-07-09 NOTE — OUTREACH NOTE
Prep Survey      Responses   [facility] patient discharged from?  [place]   Is LACE score < 7 ?  Yes   Emergency Room discharge w/ pulse ox?  No   Eligibility  Readm Mgmt   Discharge diagnosis  Acute Renal Failure,   Does the patient have one of the following disease processes/diagnoses(primary or secondary)?  Other   Does the patient have Home health ordered?  No   Is there a DME ordered?  No   Prep survey completed?  Yes          Susi Novak RN

## 2021-07-12 ENCOUNTER — READMISSION MANAGEMENT (OUTPATIENT)
Dept: CALL CENTER | Facility: HOSPITAL | Age: 46
End: 2021-07-12

## 2021-07-12 NOTE — OUTREACH NOTE
Medical Week 1 Survey      Responses   [facility] patient discharged from?  [place]   Does the patient have one of the following disease processes/diagnoses(primary or secondary)?  Other   Call start time  1103   Revoke  Decline to participate   Call end time  1104   Is the patient interested in additional calls from an ambulatory ?  NOTE:  applies to high risk patients requiring additional follow-up.  No   Wrap up additional comments  Spoke with Ro Guajardo who states he is back at work.           Aurelia Molina RN

## 2021-08-09 ENCOUNTER — OFFICE VISIT (OUTPATIENT)
Dept: CARDIOLOGY | Facility: CLINIC | Age: 46
End: 2021-08-09

## 2021-08-09 VITALS
SYSTOLIC BLOOD PRESSURE: 148 MMHG | WEIGHT: 315 LBS | OXYGEN SATURATION: 99 % | BODY MASS INDEX: 49.44 KG/M2 | DIASTOLIC BLOOD PRESSURE: 110 MMHG | HEART RATE: 77 BPM | HEIGHT: 67 IN

## 2021-08-09 DIAGNOSIS — R94.39 ABNORMAL STRESS TEST: ICD-10-CM

## 2021-08-09 DIAGNOSIS — E66.01 MORBID OBESITY WITH BMI OF 50.0-59.9, ADULT (HCC): ICD-10-CM

## 2021-08-09 DIAGNOSIS — I10 ESSENTIAL HYPERTENSION: Primary | ICD-10-CM

## 2021-08-09 DIAGNOSIS — Z72.0 TOBACCO USE: ICD-10-CM

## 2021-08-09 PROBLEM — R60.0 BILATERAL LEG EDEMA: Status: ACTIVE | Noted: 2021-07-19

## 2021-08-09 PROBLEM — M25.571 CHRONIC PAIN OF RIGHT ANKLE: Status: ACTIVE | Noted: 2021-07-19

## 2021-08-09 PROBLEM — R07.89 ATYPICAL CHEST PAIN: Status: ACTIVE | Noted: 2021-07-05

## 2021-08-09 PROBLEM — D64.9 ANEMIA: Status: ACTIVE | Noted: 2021-07-19

## 2021-08-09 PROBLEM — G89.29 CHRONIC PAIN OF RIGHT ANKLE: Status: ACTIVE | Noted: 2021-07-19

## 2021-08-09 PROBLEM — N17.9 AKI (ACUTE KIDNEY INJURY): Status: ACTIVE | Noted: 2021-07-19

## 2021-08-09 PROCEDURE — 99214 OFFICE O/P EST MOD 30 MIN: CPT | Performed by: INTERNAL MEDICINE

## 2021-08-09 PROCEDURE — 93000 ELECTROCARDIOGRAM COMPLETE: CPT | Performed by: INTERNAL MEDICINE

## 2021-08-09 RX ORDER — DILTIAZEM HYDROCHLORIDE 120 MG/1
120 CAPSULE, EXTENDED RELEASE ORAL DAILY
COMMUNITY
Start: 2021-07-19

## 2021-08-09 RX ORDER — DILTIAZEM HYDROCHLORIDE 120 MG/1
CAPSULE, EXTENDED RELEASE ORAL
COMMUNITY
Start: 2021-07-19

## 2021-08-09 RX ORDER — ERGOCALCIFEROL 1.25 MG/1
50000 CAPSULE ORAL
COMMUNITY
Start: 2021-05-26

## 2021-08-09 NOTE — PROGRESS NOTES
PATIENTINFORMATION    Date of Office Visit: 2021  Encounter Provider: Gonzalez Huynh MD  Place of Service: [facility]  Patient Name: Don Guajardo  : 1975    Subjective:     Encounter Date:2021      Patient ID: Don Guajardo is a 46 y.o. male.    Chief Complaint   Patient presents with   • Precordial Pain   • Hypertension   • Hyperlipidemia   • Acute Renal Failure     HPI  Mr. Guajardo is a 46 years old man with past medical history of hypertension, hyperlipidemia, morbid obesity, and tobacco use came to cardiology clinic for post hospital discharge follow-up.  He was admitted in to the hospital on 2021 after he presented with left-sided precordial chest pain that was more of pleuritic and he was found out to be in acute renal failure.  He was treated with supportive care/IV fluids and he had several tests including myocardial perfusion study and echocardiogram.  He was released in stable condition and denied any significant recurrence of chest discomfort.  Patient's body mass index is 55 kg/m² and has had this problem for years along with hypertension.  He came to clinic with his older brother whom he is staying with currently.  He has worked in fast food restaurant for years and he admits to not being compliant with his diet.  He denied any significant orthopnea, PND, palpitation, presyncope or syncope.  His current medication include aspirin and statin and has been off antihypertensive medications that included lisinopril/hydrochlorothiazide since after discharge from hospital.  He does not check blood pressure at home.  He has cut down tobacco from 2 packs to half pack per day currently.  He is trying to modify his diet.  No prior sleep study.    ROS   All systems reviewed and negative except as noted in HPI.    Past Medical History:   Diagnosis Date   • Hyperlipidemia    • Hypertension    • Hypoglycemia        History reviewed. No pertinent surgical  "history.    Social History     Socioeconomic History   • Marital status: Single     Spouse name: Not on file   • Number of children: Not on file   • Years of education: Not on file   • Highest education level: Not on file   Tobacco Use   • Smoking status: Current Every Day Smoker     Packs/day: 2.00     Types: Cigarettes   • Smokeless tobacco: Never Used   Vaping Use   • Vaping Use: Never assessed   Substance and Sexual Activity   • Alcohol use: Not Currently     Comment: occasionally   • Drug use: Never       History reviewed. No pertinent family history.        ECG 12 Lead    Date/Time: 8/9/2021 2:53 PM  Performed by: Gonzalez Huynh MD  Authorized by: Gonzalez Huynh MD   Comparison: compared with previous ECG from 7/6/2021  Rhythm: sinus rhythm  Rate: normal  Conduction: conduction normal  ST Segments: ST segments normal  T Waves: T waves normal  QRS axis: normal  Other findings: early transition    Clinical impression: abnormal EKG               Objective:     BP (!) 148/110   Pulse 77   Ht 170.2 cm (67\")   Wt (!) 161 kg (355 lb)   SpO2 99%   BMI 55.60 kg/m²  Body mass index is 55.6 kg/m².     Constitutional:       General: Not in acute distress.     Appearance: Morbidly obese. Not diaphoretic.   Eyes:      Pupils: Pupils are equal, round, and reactive to light.   HENT:      Head: Normocephalic and atraumatic.   Neck:      Thyroid: No thyromegaly.   Pulmonary:      Effort: Pulmonary effort is normal. No respiratory distress.      Breath sounds: Normal breath sounds. No wheezing. No rales.   Chest:      Chest wall: Not tender to palpatation.   Cardiovascular:      Normal rate. Regular rhythm.      No gallop.   Pulses:     Intact distal pulses.   Edema:     Peripheral edema absent.   Abdominal:      General: Bowel sounds are normal. There is no distension.      Palpations: Abdomen is soft.      Tenderness: There is no guarding.   Musculoskeletal: Normal range of motion.         General: No " deformity.      Cervical back: Normal range of motion and neck supple. Skin:     General: Skin is warm and dry.      Findings: No rash.   Neurological:      Mental Status: Alert and oriented to person, place, and time.      Cranial Nerves: No cranial nerve deficit.      Deep Tendon Reflexes: Reflexes are normal and symmetric.   Psychiatric:         Judgment: Judgment normal.         Review Of Data:   I have obtained and reviewed recent documentation during ER visit    TTE in July 2021 with normal LVEF and normal diastolic function.    Myocardial perfusion study on 7/7/2021 with some questionable mild basal to mid inferolateral wall ischemia        Assessment/Plan:         Essential hypertension    Abnormal stress test    Morbid obesity with BMI of 50.0-59.9, adult (CMS/[facility])    Tobacco use     Patient denied any significant chest pain currently.  Most of his comorbidities are related to morbid obesity.  We have discussed at length about importance of modifying diet and increasing level of activity to lose weight.  I will also refer him to a dietitian and bariatric surgery.  Also refer him for sleep study.  Blood pressure in the office today is 140/110 mmHg.  Patient and his brother will check blood pressure at home and call back with logs in couple of days.  May start him on a calcium channel blocker or beta-blocker as he recently had significant MIGUELINA with lisinopril/hydrochlorothiazide.  He will cut down tobacco further.    Abnormal myocardial perfusion study in July 2021 is a small area in the inferior wall which could be an artifact.  Continue medical therapy with aspirin and statin for now.    Diagnosis and plan of care discussed with patient and verbalized understanding.           Gonzalez Huynh MD  08/09/21  15:19 EDT

## 2021-10-26 ENCOUNTER — APPOINTMENT (OUTPATIENT)
Dept: SLEEP MEDICINE | Facility: HOSPITAL | Age: 46
End: 2021-10-26

## 2023-04-20 ENCOUNTER — APPOINTMENT (OUTPATIENT)
Dept: GENERAL RADIOLOGY | Facility: HOSPITAL | Age: 48
End: 2023-04-20
Payer: COMMERCIAL

## 2023-04-20 ENCOUNTER — HOSPITAL ENCOUNTER (EMERGENCY)
Facility: HOSPITAL | Age: 48
Discharge: HOME | End: 2023-04-20
Attending: EMERGENCY MEDICINE
Payer: COMMERCIAL

## 2023-04-20 VITALS
WEIGHT: 315 LBS | DIASTOLIC BLOOD PRESSURE: 81 MMHG | OXYGEN SATURATION: 94 % | HEIGHT: 67 IN | BODY MASS INDEX: 49.44 KG/M2 | HEART RATE: 68 BPM | RESPIRATION RATE: 20 BRPM | TEMPERATURE: 98.7 F | SYSTOLIC BLOOD PRESSURE: 138 MMHG

## 2023-04-20 DIAGNOSIS — R07.9 CHEST PAIN, UNSPECIFIED TYPE: Primary | ICD-10-CM

## 2023-04-20 LAB
ALBUMIN SERPL-MCNC: 4.2 G/DL (ref 3.5–5.2)
ALBUMIN/GLOB SERPL: 1.2 G/DL
ALP SERPL-CCNC: 74 U/L (ref 39–117)
ALT SERPL W P-5'-P-CCNC: 14 U/L (ref 1–41)
ANION GAP SERPL CALCULATED.3IONS-SCNC: 8.2 MMOL/L (ref 5–15)
AST SERPL-CCNC: 15 U/L (ref 1–40)
BASOPHILS # BLD AUTO: 0.03 10*3/MM3 (ref 0–0.2)
BASOPHILS NFR BLD AUTO: 0.4 % (ref 0–1.5)
BILIRUB SERPL-MCNC: 0.4 MG/DL (ref 0–1.2)
BUN SERPL-MCNC: 15 MG/DL (ref 6–20)
BUN/CREAT SERPL: 16.7 (ref 7–25)
CALCIUM SPEC-SCNC: 9.2 MG/DL (ref 8.6–10.5)
CHLORIDE SERPL-SCNC: 101 MMOL/L (ref 98–107)
CO2 SERPL-SCNC: 27.8 MMOL/L (ref 22–29)
CREAT SERPL-MCNC: 0.9 MG/DL (ref 0.76–1.27)
D DIMER PPP FEU-MCNC: 0.45 MCGFEU/ML (ref 0–0.5)
DEPRECATED RDW RBC AUTO: 46.7 FL (ref 37–54)
EGFRCR SERPLBLD CKD-EPI 2021: 106.7 ML/MIN/1.73
EOSINOPHIL # BLD AUTO: 0.17 10*3/MM3 (ref 0–0.4)
EOSINOPHIL NFR BLD AUTO: 2.3 % (ref 0.3–6.2)
ERYTHROCYTE [DISTWIDTH] IN BLOOD BY AUTOMATED COUNT: 13.9 % (ref 12.3–15.4)
GEN 5 2HR TROPONIN T REFLEX: 8 NG/L
GLOBULIN UR ELPH-MCNC: 3.5 GM/DL
GLUCOSE SERPL-MCNC: 128 MG/DL (ref 65–99)
HCT VFR BLD AUTO: 40.9 % (ref 37.5–51)
HGB BLD-MCNC: 13 G/DL (ref 13–17.7)
HOLD SPECIMEN: NORMAL
HOLD SPECIMEN: NORMAL
IMM GRANULOCYTES # BLD AUTO: 0.03 10*3/MM3 (ref 0–0.05)
IMM GRANULOCYTES NFR BLD AUTO: 0.4 % (ref 0–0.5)
LYMPHOCYTES # BLD AUTO: 1.55 10*3/MM3 (ref 0.7–3.1)
LYMPHOCYTES NFR BLD AUTO: 21.3 % (ref 19.6–45.3)
MCH RBC QN AUTO: 29 PG (ref 26.6–33)
MCHC RBC AUTO-ENTMCNC: 31.8 G/DL (ref 31.5–35.7)
MCV RBC AUTO: 91.3 FL (ref 79–97)
MONOCYTES # BLD AUTO: 0.47 10*3/MM3 (ref 0.1–0.9)
MONOCYTES NFR BLD AUTO: 6.4 % (ref 5–12)
NEUTROPHILS NFR BLD AUTO: 5.04 10*3/MM3 (ref 1.7–7)
NEUTROPHILS NFR BLD AUTO: 69.2 % (ref 42.7–76)
NRBC BLD AUTO-RTO: 0 /100 WBC (ref 0–0.2)
NT-PROBNP SERPL-MCNC: 259.7 PG/ML (ref 0–450)
PLATELET # BLD AUTO: 259 10*3/MM3 (ref 140–450)
PMV BLD AUTO: 10.2 FL (ref 6–12)
POTASSIUM SERPL-SCNC: 3.9 MMOL/L (ref 3.5–5.2)
PROT SERPL-MCNC: 7.7 G/DL (ref 6–8.5)
QT INTERVAL: 381 MS
RBC # BLD AUTO: 4.48 10*6/MM3 (ref 4.14–5.8)
SODIUM SERPL-SCNC: 137 MMOL/L (ref 136–145)
TROPONIN T DELTA: -1 NG/L
TROPONIN T SERPL HS-MCNC: 9 NG/L
WBC NRBC COR # BLD: 7.29 10*3/MM3 (ref 3.4–10.8)
WHOLE BLOOD HOLD COAG: NORMAL
WHOLE BLOOD HOLD SPECIMEN: NORMAL

## 2023-04-20 PROCEDURE — 85025 COMPLETE CBC W/AUTO DIFF WBC: CPT | Performed by: EMERGENCY MEDICINE

## 2023-04-20 PROCEDURE — 83880 ASSAY OF NATRIURETIC PEPTIDE: CPT | Performed by: EMERGENCY MEDICINE

## 2023-04-20 PROCEDURE — 85379 FIBRIN DEGRADATION QUANT: CPT | Performed by: EMERGENCY MEDICINE

## 2023-04-20 PROCEDURE — 80053 COMPREHEN METABOLIC PANEL: CPT | Performed by: EMERGENCY MEDICINE

## 2023-04-20 PROCEDURE — 36415 COLL VENOUS BLD VENIPUNCTURE: CPT

## 2023-04-20 PROCEDURE — 84484 ASSAY OF TROPONIN QUANT: CPT | Performed by: EMERGENCY MEDICINE

## 2023-04-20 PROCEDURE — 93005 ELECTROCARDIOGRAM TRACING: CPT | Performed by: EMERGENCY MEDICINE

## 2023-04-20 PROCEDURE — 93010 ELECTROCARDIOGRAM REPORT: CPT | Performed by: INTERNAL MEDICINE

## 2023-04-20 PROCEDURE — 99284 EMERGENCY DEPT VISIT MOD MDM: CPT

## 2023-04-20 PROCEDURE — 71046 X-RAY EXAM CHEST 2 VIEWS: CPT

## 2023-04-20 PROCEDURE — 93005 ELECTROCARDIOGRAM TRACING: CPT

## 2023-04-20 RX ORDER — SODIUM CHLORIDE 0.9 % (FLUSH) 0.9 %
10 SYRINGE (ML) INJECTION AS NEEDED
Status: DISCONTINUED | OUTPATIENT
Start: 2023-04-20 | End: 2023-04-20 | Stop reason: HOSPADM

## 2023-04-20 RX ORDER — ASPIRIN 325 MG
325 TABLET ORAL ONCE
Status: COMPLETED | OUTPATIENT
Start: 2023-04-20 | End: 2023-04-20

## 2023-04-20 RX ADMIN — ASPIRIN 325 MG: 325 TABLET ORAL at 16:08

## 2023-04-20 NOTE — DISCHARGE INSTRUCTIONS
Call the patient connection line in the morning for a follow-up appointment with local cardiology within 1 week..

## 2023-04-20 NOTE — ED NOTES
Pt stated that since last Tuesday he has been working non stop. Pt stated that he was exhausted last night so he went to bed early and then woke up with chest pain in the middle of his chest. Pt denies CP at this time. Pt does report SOB, at rest and upon exertion. Pt described the CP as a tightness.

## 2023-04-20 NOTE — ED PROVIDER NOTES
Subjective   History of Present Illness  History of Present Illness    Chief complaint: Chest pain    Location: Substernal, nonradiating    Quality/Severity: 5/10 at its worst, 0/10 current, tightness    Timing/Onset/Duration: Started at 9 PM last night, lasted approximately 40 minutes.  Returned at 130 today and lasted about 20 minutes.    Modifying Factors: Nothing makes it better or worse    Associated Symptoms: No headache.  No fever or chills.  Patient has a smoker's cough that is no worse than usual.  No sore throat earache or nasal congestion.  Patient did complain of some shortness of breath.  He got diaphoretic.  No nausea or vomiting.  No abdominal pain.  No diarrhea or burning when he urinates.    Narrative: This 48-year-old white male presents with chest pain in the middle of his chest since last night.  He states it was a tightness.  No pain at this time.  This patient smokes 2 packs/day of cigarettes.  He does not drink alcohol or take any illicit drugs.  The patient has hypertension, he smokes, he has elevated cholesterol.  He is not diabetic.  He has never had an MI.  No one in the family has ever had an MI.  The patient has never had a stress test or cardiac catheterization.  The patient has never had a blood clot in his leg or lung.  No recent long trips or surgeries.  No bleeding or clotting problems.  No cancer.  The patient is not on hormone therapy.    PCP:  Deana Mcconnell MD       Review of Systems   Constitutional: Negative for chills.   HENT: Negative for congestion, ear pain and sore throat.    Respiratory: Positive for cough (Smoker's cough, no change) and shortness of breath.    Cardiovascular: Positive for chest pain.   Gastrointestinal: Negative for abdominal pain, diarrhea, nausea and vomiting.   Genitourinary: Negative for dysuria.   Musculoskeletal: Negative for back pain.   Skin: Negative for rash.   Neurological: Negative for headaches.   Psychiatric/Behavioral: Negative for  confusion.        Medication List      ASK your doctor about these medications    Cartia  MG 24 hr capsule  Generic drug: dilTIAZem CD     dilTIAZem 120 MG 24 hr capsule  Commonly known as: TIAZAC     ergocalciferol 1.25 MG (30176 UT) capsule  Commonly known as: ERGOCALCIFEROL     nicotine 14 MG/24HR patch  Commonly known as: NICODERM CQ  Place 1 patch on the skin as directed by provider Daily.     rosuvastatin 5 MG tablet  Commonly known as: Crestor  Take 1 tablet by mouth Every Night for 30 days.            Past Medical History:   Diagnosis Date   • Hyperlipidemia    • Hypertension    • Hypoglycemia        No Known Allergies    History reviewed. No pertinent surgical history.    History reviewed. No pertinent family history.    Social History     Socioeconomic History   • Marital status: Single   Tobacco Use   • Smoking status: Every Day     Packs/day: 2.00     Types: Cigarettes   • Smokeless tobacco: Never   Substance and Sexual Activity   • Alcohol use: Not Currently     Comment: occasionally   • Drug use: Never   • Sexual activity: Never           Objective   Physical Exam  Vitals (The temperature is 98.7 °F, pulse 75, respirations 20, /95, room air pulse ox 93%) and nursing note reviewed.   Constitutional:       Appearance: He is well-developed. He is obese.   HENT:      Head: Normocephalic and atraumatic.   Cardiovascular:      Rate and Rhythm: Normal rate and regular rhythm.      Heart sounds: No murmur heard.    No friction rub. No gallop.   Pulmonary:      Effort: Pulmonary effort is normal.   Chest:      Chest wall: No tenderness.   Abdominal:      General: Bowel sounds are normal.      Palpations: Abdomen is soft. There is no mass.      Tenderness: There is no abdominal tenderness. There is no guarding or rebound.   Musculoskeletal:      Cervical back: Normal range of motion and neck supple.      Right lower leg: Edema (Trace) present.      Left lower leg: Edema (1+) present.   Skin:      General: Skin is warm and dry.      Findings: No rash.   Neurological:      General: No focal deficit present.      Mental Status: He is alert and oriented to person, place, and time.   Psychiatric:         Mood and Affect: Mood normal.         Procedures           ED Course  ED Course as of 04/20/23 1814   Thu Apr 20, 2023   1654 D-dimer is normal at 0.45.    The high-sensitivity troponin is normal at 9    The serum glucose is 126.  The CMP is otherwise unremarkable.    The proBNP is 259.    The CBC is unremarkable [RC]   1736 The repeat troponin is 8, the delta T -1 [RC]      ED Course User Index  [RC] Artem Matthews MD      15:44 EDT, 04/20/23:  The EKG was obtained at 1524 and read by me at 1526.  EKG shows a normal sinus rhythm with a rate of 70.  There is a normal axis with no hypertrophy.  The NE, QRS, and QT intervals are unremarkable.  There is no ectopy.  There is no acute ST elevation or depression.       16:55 EDT, 04/20/23:  The patient's repeat blood pressure is 138/91     18:14 EDT, 04/20/23:  The patient's diagnosis of chest pain was discussed with him.  The delta T is -1.  The EKG is unconcerning.  The laboratory values are otherwise unremarkable with a normal D-dimer and BNP.  The patient was reassessed.  He has no complaints.  His vital signs were reviewed and are stable.  The patient should call the patient connection line in the morning for an appointment with [facility] within 1 week.  He should return to the emergency department there is worsening pain, shortness of breath, worse in any way at all.  All the patient's questions were answered he will be discharged in good condition    18:15 EDT, 04/20/23:  The heart score is 4.                           MDM    Final diagnoses:   None       ED Disposition  ED Disposition     None          No follow-up provider specified.       Medication List      No changes were made to your prescriptions during this visit.          Byron  Artem HI MD  04/20/23 [id number]       Artem Matthews MD  04/20/23 1427

## 2023-04-27 NOTE — PROGRESS NOTES
Date of Office Visit: 2023  Encounter Provider: CHILO Paul  Place of Service: [facility] CARDIOLOGY  Patient Name: Don Guajardo  :1975    No chief complaint on file.  : hospital follow up    HPI: Don Guajardo is a 48 y.o. male who is a patient of Dr. Huynh.  He is new to me today and presents for a 1 week hospital follow-up.  He has a history of hypertension, tobacco abuse, hyperlipidemia and obesity.       He presented to [facility] 2023 with complaints of chest pain in the middle of his chest.  Described as tightness.  Has been awakened by the discomfort. EKG showed no ischemia.  High-sensitivity troponin negative.  Other labs unremarkable.  CXR showed old granulomatosis disease.  He had a Lexiscan stress test in 2021 that showed small sized, mildly severe area of ischemia located in basal inferior lateral wall, EF 55%.  2D echocardiogram at that time showed normal LV systolic function, grade 2 diastolic dysfunction and no valvular disease.  Patient was continued on medical therapy with aspirin and statin.    Patient presents today stating that he has probably had about 18 days in the last month where he is experienced chest pressure.  He says its not pain but pressure in the midsternal area.  Nothing aggravates or relieves.  Patient is quite sedentary and does not get any exercise activity in.  He is morbidly obese, smokes about 2 packs of cigars a day.  Blood pressure slightly elevated at 138/90.  Patient states that he is not taking any of his previous medications because he did not have refills.  He is taking aspirin 81 mg daily.  Patient does not have a primary care provider.  He will call Deana Mcconnell, KITTY, as he previously had an appointment that was canceled.      Previous testing and notes have been reviewed by me.   Past Medical History:   Diagnosis Date   • Hyperlipidemia    • Hypertension    • Hypoglycemia         No past surgical history on file.    Social History     Socioeconomic History   • Marital status: Single   Tobacco Use   • Smoking status: Every Day     Packs/day: 2.00     Types: Cigarettes   • Smokeless tobacco: Never   Substance and Sexual Activity   • Alcohol use: Not Currently     Comment: occasionally   • Drug use: Never   • Sexual activity: Never       No family history on file.    Review of Systems   Constitutional: Negative.   HENT: Negative.    Cardiovascular: Positive for chest pain.   Respiratory: Positive for cough and wheezing.    Endocrine: Negative.    Hematologic/Lymphatic: Negative.    Skin: Negative.    Musculoskeletal: Negative.    Gastrointestinal: Negative.    Genitourinary: Negative.    Neurological: Negative.    Psychiatric/Behavioral: Negative.    Allergic/Immunologic: Negative.        No Known Allergies      Current Outpatient Medications:   •  Cartia  MG 24 hr capsule, , Disp: , Rfl:   •  dilTIAZem (TIAZAC) 120 MG 24 hr capsule, Take 120 mg by mouth Daily., Disp: , Rfl:   •  ergocalciferol (ERGOCALCIFEROL) 1.25 MG (28434 UT) capsule, Take 50,000 Units by mouth., Disp: , Rfl:   •  nicotine (NICODERM CQ) 14 MG/24HR patch, Place 1 patch on the skin as directed by provider Daily., Disp: , Rfl:   •  rosuvastatin (Crestor) 5 MG tablet, Take 1 tablet by mouth Every Night for 30 days., Disp: 30 tablet, Rfl: 0      Objective:     There were no vitals filed for this visit.  There is no height or weight on file to calculate BMI.     Lexiscan stress test 7/6/2021:  • Left ventricular ejection fraction is normal. (Calculated EF = 55%).  • Myocardial perfusion imaging indicates a small-sized, mildly severe area of ischemia located in the basal inferior lateral wall.  • Impressions are consistent with a low risk study.     2D Echocardiogram 7/6/2021:  • Estimated left ventricular EF = 55% Left ventricular systolic function is normal. Normal left ventricular cavity size and wall thickness  noted. All left ventricular wall segments contract normally. Left ventricular diastolic function is consistent with (grade II w/high LAP) pseudonormalization.  • Limited 2D and Doppler imaging of cardiac valves with no obvious valve dysfunction..      PHYSICAL EXAM:    Constitutional:       Appearance: Not in distress.   Neck:      Vascular: No JVR. JVD normal.   Pulmonary:      Effort: Pulmonary effort is normal.      Breath sounds: Wheezing present. No rhonchi. No rales.      Comments: Scattered wheezes throughout as well as diminished breath sounds  Chest:      Chest wall: Not tender to palpatation.   Cardiovascular:      PMI at left midclavicular line. Normal rate. Regular rhythm. Normal S1. Normal S2.      Murmurs: There is no murmur.      No gallop. No click. No rub.      Comments: Generalized  Pulses:     Intact distal pulses.   Edema:     Peripheral edema absent.   Abdominal:      General: Bowel sounds are normal.      Palpations: Abdomen is soft.      Tenderness: There is no abdominal tenderness.   Musculoskeletal: Normal range of motion.         General: No tenderness. Skin:     General: Skin is warm and dry.   Neurological:      General: No focal deficit present.      Mental Status: Alert and oriented to person, place and time.           ECG 12 Lead    Date/Time: 4/28/2023 11:42 AM  Performed by: Hodan Zaragoza APRN  Authorized by: Hodan Zaragoza APRN   Comparison: compared with previous ECG from 4/20/2023  Similar to previous ECG  Rhythm: sinus rhythm  Rate: normal  BPM: 65  Conduction: conduction normal  ST Segments: ST segments normal  T Waves: T waves normal                Assessment:       Diagnosis Plan   1. Abnormal stress test        2. Essential hypertension        3. Morbid obesity with BMI of 50.0-59.9, adult        4. Tobacco use          No orders of the defined types were placed in this encounter.         Plan:       1.  Chest pain: Described as intermittent pressure.  Abnormal  myocardial perfusion study in July 2021 with small area in inferior wall which could be artifact.  Continue medical therapy with aspirin.  Will repeat stress test, as patient has high risk factors.   2.  Hypertension: slightly elevated.  Start amlodipine 5mg daily.   3.  Hyperlipidemia: Last lipid panel in 2021.  On low-dose statin. Order placed for fasting lipid panel.   4.  Tobacco abuse: Smokes 2 packs of cigars a day.  Recommend smoking cessation.  5.  Obesity: weight loss recommended    Patient unable to do Lexiscan stress test or lipid panel today due to drinking Dr. Pepper.  Order placed for both.  Patient to call KITTY Marin to schedule apptmt.  I have encouraged him to work on these risk factors and follow-up with his PCP.  Patient is dependent on his brother and nephew for rides as he does not drive.  He will call to schedule his stress test and next office appointment which will be 6 months with Doctor Sylvia Henderson.  He will have his lab obtain this soon as possible.         Your medication list          Accurate as of April 27, 2023  1:17 PM. If you have any questions, ask your nurse or doctor.            CONTINUE taking these medications      Instructions Last Dose Given Next Dose Due   Cartia  MG 24 hr capsule  Generic drug: dilTIAZem CD           dilTIAZem 120 MG 24 hr capsule  Commonly known as: TIAZAC      Take 120 mg by mouth Daily.       ergocalciferol 1.25 MG (43304 UT) capsule  Commonly known as: ERGOCALCIFEROL      Take 50,000 Units by mouth.       nicotine 14 MG/24HR patch  Commonly known as: NICODERM CQ      Place 1 patch on the skin as directed by provider Daily.       rosuvastatin 5 MG tablet  Commonly known as: Crestor      Take 1 tablet by mouth Every Night for 30 days.                As always, it has been a pleasure to participate in your patient's care.      Sincerely,       CHILO Gallegos

## 2023-04-28 ENCOUNTER — OFFICE VISIT (OUTPATIENT)
Dept: CARDIOLOGY | Facility: CLINIC | Age: 48
End: 2023-04-28
Payer: COMMERCIAL

## 2023-04-28 VITALS
BODY MASS INDEX: 49.44 KG/M2 | SYSTOLIC BLOOD PRESSURE: 138 MMHG | HEART RATE: 65 BPM | WEIGHT: 315 LBS | DIASTOLIC BLOOD PRESSURE: 90 MMHG | HEIGHT: 67 IN

## 2023-04-28 DIAGNOSIS — E66.01 MORBID OBESITY WITH BMI OF 50.0-59.9, ADULT: ICD-10-CM

## 2023-04-28 DIAGNOSIS — R07.9 CHRONIC CHEST PAIN WITH LOW TO MODERATE RISK FOR CAD: ICD-10-CM

## 2023-04-28 DIAGNOSIS — E78.00 HYPERCHOLESTEREMIA: ICD-10-CM

## 2023-04-28 DIAGNOSIS — Z91.89 CHRONIC CHEST PAIN WITH LOW TO MODERATE RISK FOR CAD: ICD-10-CM

## 2023-04-28 DIAGNOSIS — Z72.0 TOBACCO USE: ICD-10-CM

## 2023-04-28 DIAGNOSIS — R94.39 ABNORMAL STRESS TEST: Primary | ICD-10-CM

## 2023-04-28 DIAGNOSIS — I10 ESSENTIAL HYPERTENSION: ICD-10-CM

## 2023-04-28 DIAGNOSIS — G89.29 CHRONIC CHEST PAIN WITH LOW TO MODERATE RISK FOR CAD: ICD-10-CM

## 2023-04-28 PROCEDURE — 99214 OFFICE O/P EST MOD 30 MIN: CPT | Performed by: NURSE PRACTITIONER

## 2023-04-28 PROCEDURE — 93000 ELECTROCARDIOGRAM COMPLETE: CPT | Performed by: NURSE PRACTITIONER

## 2023-04-28 PROCEDURE — 3075F SYST BP GE 130 - 139MM HG: CPT | Performed by: NURSE PRACTITIONER

## 2023-04-28 PROCEDURE — 3080F DIAST BP >= 90 MM HG: CPT | Performed by: NURSE PRACTITIONER

## 2023-04-28 RX ORDER — ASPIRIN 81 MG/1
81 TABLET, CHEWABLE ORAL DAILY
COMMUNITY

## 2023-04-28 RX ORDER — AMLODIPINE BESYLATE 5 MG/1
5 TABLET ORAL DAILY
Qty: 90 TABLET | Refills: 3 | Status: SHIPPED | OUTPATIENT
Start: 2023-04-28

## 2025-05-19 ENCOUNTER — APPOINTMENT (OUTPATIENT)
Dept: GENERAL RADIOLOGY | Facility: HOSPITAL | Age: 50
End: 2025-05-19
Payer: MEDICAID

## 2025-05-19 ENCOUNTER — HOSPITAL ENCOUNTER (EMERGENCY)
Facility: HOSPITAL | Age: 50
Discharge: HOME | End: 2025-05-19
Attending: STUDENT IN AN ORGANIZED HEALTH CARE EDUCATION/TRAINING PROGRAM | Admitting: STUDENT IN AN ORGANIZED HEALTH CARE EDUCATION/TRAINING PROGRAM
Payer: MEDICAID

## 2025-05-19 VITALS
HEART RATE: 78 BPM | RESPIRATION RATE: 20 BRPM | DIASTOLIC BLOOD PRESSURE: 107 MMHG | TEMPERATURE: 97.9 F | SYSTOLIC BLOOD PRESSURE: 163 MMHG | OXYGEN SATURATION: 93 %

## 2025-05-19 DIAGNOSIS — S46.911A STRAIN OF RIGHT SHOULDER, INITIAL ENCOUNTER: Primary | ICD-10-CM

## 2025-05-19 DIAGNOSIS — S59.909A ELBOW INJURY, INITIAL ENCOUNTER: ICD-10-CM

## 2025-05-19 PROCEDURE — 99283 EMERGENCY DEPT VISIT LOW MDM: CPT | Performed by: STUDENT IN AN ORGANIZED HEALTH CARE EDUCATION/TRAINING PROGRAM

## 2025-05-19 PROCEDURE — 73030 X-RAY EXAM OF SHOULDER: CPT

## 2025-05-19 PROCEDURE — 73110 X-RAY EXAM OF WRIST: CPT

## 2025-05-19 PROCEDURE — 73080 X-RAY EXAM OF ELBOW: CPT

## 2025-05-19 RX ORDER — HYDROCODONE BITARTRATE AND ACETAMINOPHEN 5; 325 MG/1; MG/1
1 TABLET ORAL EVERY 6 HOURS PRN
Qty: 15 TABLET | Refills: 0 | Status: SHIPPED | OUTPATIENT
Start: 2025-05-19

## 2025-05-19 RX ORDER — HYDROCODONE BITARTRATE AND ACETAMINOPHEN 10; 325 MG/1; MG/1
1 TABLET ORAL EVERY 6 HOURS PRN
Refills: 0 | Status: DISCONTINUED | OUTPATIENT
Start: 2025-05-19 | End: 2025-05-19 | Stop reason: HOSPADM

## 2025-05-19 RX ORDER — NAPROXEN 500 MG/1
500 TABLET ORAL 2 TIMES DAILY PRN
Qty: 20 TABLET | Refills: 0 | Status: SHIPPED | OUTPATIENT
Start: 2025-05-19

## 2025-05-19 RX ADMIN — HYDROCODONE BITARTRATE AND ACETAMINOPHEN 1 TABLET: 10; 325 TABLET ORAL at 20:02

## 2025-05-19 NOTE — Clinical Note
[facility] EMERGENCY DEPARTMENT  [address]  [place] [address]  Phone: [phone]    Don Guajardo was seen and treated in our emergency department on 5/19/2025.  He may return to work on 05/22/2025.         Thank you for choosing [facility].    Power Martinez, DO

## 2025-05-19 NOTE — Clinical Note
[facility] EMERGENCY DEPARTMENT  [address]  [place] [address]  Phone: [phone]    Don Guajardo was seen and treated in our emergency department on 5/19/2025.  He may return to work on 05/22/2025.         Thank you for choosing [facility].    Power Martinez, DO

## 2025-05-20 NOTE — ED PROVIDER NOTES
Subjective   History of Present Illness  This is a healthy 50-year-old who presents to the emergency department with right arm pain to the shoulder, elbow and wrist after he fell while at work.  He was in the freezer and he slipped and fell landing on his right arm.  Did not hit his head or lose consciousness.  Denies any numbness tingling or loss in sensation.      Review of Systems   Constitutional:  Negative for activity change, chills, diaphoresis and fever.   HENT: Negative.     Respiratory: Negative.     Cardiovascular: Negative.    Skin: Negative.    Neurological: Negative.    All other systems reviewed and are negative.      Past Medical History:   Diagnosis Date    Hyperlipidemia     Hypertension     Hypoglycemia        No Known Allergies    No past surgical history on file.    No family history on file.    Social History     Socioeconomic History    Marital status: Single   Tobacco Use    Smoking status: Every Day     Current packs/day: 2.00     Types: Cigarettes    Smokeless tobacco: Never   Substance and Sexual Activity    Alcohol use: Not Currently     Comment: occasionally    Drug use: Never    Sexual activity: Never           Objective   Physical Exam  Vitals and nursing note reviewed.   Constitutional:       Appearance: Normal appearance. He is obese. He is not ill-appearing, toxic-appearing or diaphoretic.   HENT:      Head: Normocephalic and atraumatic.      Right Ear: External ear normal.      Left Ear: External ear normal.      Nose: Nose normal. No congestion or rhinorrhea.      Mouth/Throat:      Pharynx: No oropharyngeal exudate or posterior oropharyngeal erythema.   Eyes:      Extraocular Movements: Extraocular movements intact.      Conjunctiva/sclera: Conjunctivae normal.      Pupils: Pupils are equal, round, and reactive to light.   Cardiovascular:      Rate and Rhythm: Normal rate and regular rhythm.      Pulses: Normal pulses.   Pulmonary:      Effort: Pulmonary effort is normal.       Breath sounds: Normal breath sounds. No stridor. No wheezing, rhonchi or rales.   Chest:      Chest wall: No tenderness.   Abdominal:      General: There is no distension.      Palpations: Abdomen is soft. There is no mass.   Musculoskeletal:         General: No swelling, tenderness or signs of injury. Normal range of motion.      Cervical back: Normal range of motion. No rigidity or tenderness.      Comments: On evaluation of the shoulder, there is no crepitus or obvious deformity, no dislocation is noted.  Pulses 2+ in the bilateral upper extremities and  strength is within normal limits.  No pain to the elbow suggestive of the lateral or medial epicondylitis.  There is no tenderness of the cervical spine.  There is some mild to moderate tenderness to palpation diffusely throughout the shoulder.    Neer test for evaluation o the rotator cuff is negative for significant impingement or abnormality     Winston test shows no evidence of impingement or pain.    There is no evidence of acute abnormality to the elbow, wrist.  Pulses again in all extremities are within normal limits.  The patient is tender but otherwise no evidence of abnormality.  Compartments are soft.   Skin:     General: Skin is warm.      Capillary Refill: Capillary refill takes less than 2 seconds.      Coloration: Skin is not jaundiced or pale.      Findings: No bruising.   Neurological:      General: No focal deficit present.      Mental Status: He is alert and oriented to person, place, and time. Mental status is at baseline.      Motor: No weakness.         Procedures           ED Course                                                       Medical Decision Making  Splinting Procedure Note  Time:       Confirmed correct: Patient, procedure, side, site  Consent: Patient / legal guardian,     Indication: Shoulder sprain  Location: Right upper extremity    Pre procedure exam: Circulation motor and sensory intact  Post procedure exam:  Circulation motor and sensory intact    Immobilization: Arm sling    Post splint application distal neurovascular exam normal    Patient tolerated: Well  Performed by: Power Martinez DO  Total time: 10 minutes  ================      MDM:    Escalation of care including admission/observation considered    - Discussions of management with other providers:  None    - Discussed/reviewed with Radiology regarding test interpretation    - Independent interpretation: XR    - Additional patient history obtained from: None    - Review of external non-ED record (if available):  Prior Inpt record, Office record, Outpt record, Prior Outpt labs, PCP record, Outside ED record, Other    - Chronic conditions affecting care: See HPI and medical Hx.    - Social Determinants of health significantly affecting care:  None        Medical Decision Making Discussion:    Well-appearing 50-year-old presents after he fell at work.  The patient has likely some shoulder strain versus sprain.  I see no evidence of bony abnormality on imaging.  The patient is neurovascularly intact otherwise comfortable.  He will be discharged at this time with outpatient follow-up and I do recommend MRI outpatient.  I will give pain control for home.  Stable for discharge.    The patient has been given very strict return precautions to return to the emergency department should there be any acute change or worsening of their condition.  I have explained my findings and the patient has expressed understanding to me.  I explained that the work-up performed in the ED has been based on the specific complaint and concern, as the nature of emergency medicine is complaint driven and they understand that new symptoms may arise.  I have told them that, should there be any new symptoms, worsening or changing symptoms, a new work-up may be indicated that they are encouraged to return to the emergency department or promptly contact their primary care physician. We have  employed a shared decision-making process as the discussion of their disposition.  The patient has been educated as to the nature of the visit, the tests and work-up performed and the findings from today's visit. At this time, there does not appear to be any acute emergent process that necessitates admission to the hospital, however, the patient understands that this can change unexpectedly. At this time, the patient is stable for discharge home and agrees to follow-up with her primary care physician in the next 24 to 48 hours or earlier should they be able to obtain an appointment.    The patient was counseled regarding diagnostic results and treatment plan and patient has indicated understanding of these instructions.      Amount and/or Complexity of Data Reviewed  Radiology: ordered.    Risk  Prescription drug management.        Final diagnoses:   Strain of right shoulder, initial encounter   Elbow injury, initial encounter       ED Disposition  ED Disposition       ED Disposition   Discharge    Condition   Good    Comment   --               Deana Mcconnell MD  [address] [facility] [address] [phone]               Medication List        New Prescriptions      HYDROcodone-acetaminophen 5-325 MG per tablet  Commonly known as: NORCO  Take 1 tablet by mouth Every 6 (Six) Hours As Needed for Severe Pain.     naproxen 500 MG EC tablet  Commonly known as: EC NAPROSYN  Take 1 tablet by mouth 2 (Two) Times a Day As Needed (pain).               Where to Get Your Medications        These medications were sent to [facility] [id number] - [place], [place] - [address] [facility] [address] - [phone] [facility] [phone]   [address] [address]      Phone: [phone]   HYDROcodone-acetaminophen 5-325 MG per tablet  naproxen 500 MG EC tablet            Power Martinez DO  05/19/25 2024

## 2025-06-25 ENCOUNTER — TRANSCRIBE ORDERS (OUTPATIENT)
Dept: MRI IMAGING | Facility: HOSPITAL | Age: 50
End: 2025-06-25

## 2025-06-25 DIAGNOSIS — M75.101 TEAR OF RIGHT ROTATOR CUFF, UNSPECIFIED TEAR EXTENT, UNSPECIFIED WHETHER TRAUMATIC: Primary | ICD-10-CM

## 2025-07-01 ENCOUNTER — TRANSCRIBE ORDERS (OUTPATIENT)
Dept: ADMINISTRATIVE | Facility: HOSPITAL | Age: 50
End: 2025-07-01

## 2025-07-01 DIAGNOSIS — M75.101 TEAR OF RIGHT ROTATOR CUFF, UNSPECIFIED TEAR EXTENT, UNSPECIFIED WHETHER TRAUMATIC: Primary | ICD-10-CM

## 2025-07-30 ENCOUNTER — HOSPITAL ENCOUNTER (OUTPATIENT)
Dept: MRI IMAGING | Facility: HOSPITAL | Age: 50
Discharge: HOME | End: 2025-07-30
Payer: OTHER MISCELLANEOUS

## 2025-07-30 ENCOUNTER — HOSPITAL ENCOUNTER (OUTPATIENT)
Dept: GENERAL RADIOLOGY | Facility: HOSPITAL | Age: 50
Discharge: HOME | End: 2025-07-30
Payer: OTHER MISCELLANEOUS

## 2025-07-30 DIAGNOSIS — M75.101 TEAR OF RIGHT ROTATOR CUFF, UNSPECIFIED TEAR EXTENT, UNSPECIFIED WHETHER TRAUMATIC: ICD-10-CM

## 2025-07-30 PROCEDURE — 73222 MRI JOINT UPR EXTREM W/DYE: CPT

## 2025-07-30 PROCEDURE — 25510000001 IOPAMIDOL 61 % SOLUTION: Performed by: EMERGENCY MEDICINE

## 2025-07-30 PROCEDURE — 77002 NEEDLE LOCALIZATION BY XRAY: CPT

## 2025-07-30 PROCEDURE — A9573 GADOPICLENOL 0.5 MMOL/ML SOLUTION: HCPCS | Performed by: EMERGENCY MEDICINE

## 2025-07-30 PROCEDURE — 25510000001 GADOPICLENOL 0.5 MMOL/ML SOLUTION: Performed by: EMERGENCY MEDICINE

## 2025-07-30 PROCEDURE — 25010000002 LIDOCAINE 1 % SOLUTION: Performed by: EMERGENCY MEDICINE

## 2025-07-30 RX ORDER — IOPAMIDOL 612 MG/ML
10 INJECTION, SOLUTION INTRAVASCULAR
Status: COMPLETED | OUTPATIENT
Start: 2025-07-30 | End: 2025-07-30

## 2025-07-30 RX ORDER — MAGNESIUM HYDROXIDE 1200 MG/15ML
5 LIQUID ORAL ONCE
Status: COMPLETED | OUTPATIENT
Start: 2025-07-30 | End: 2025-07-30

## 2025-07-30 RX ORDER — LIDOCAINE HYDROCHLORIDE 10 MG/ML
10 INJECTION, SOLUTION INFILTRATION; PERINEURAL ONCE
Status: COMPLETED | OUTPATIENT
Start: 2025-07-30 | End: 2025-07-30

## 2025-07-30 RX ADMIN — GADOPICLENOL 1 ML: 485.1 INJECTION INTRAVENOUS at 12:45

## 2025-07-30 RX ADMIN — LIDOCAINE HYDROCHLORIDE 10 ML: 10 INJECTION, SOLUTION INFILTRATION; PERINEURAL at 12:41

## 2025-07-30 RX ADMIN — SODIUM CHLORIDE 5 ML: 900 IRRIGANT IRRIGATION at 12:41

## 2025-07-30 RX ADMIN — IOPAMIDOL 10 ML: 612 INJECTION, SOLUTION INTRAVENOUS at 12:40

## 2025-08-11 ENCOUNTER — OFFICE VISIT (OUTPATIENT)
Dept: ORTHOPEDIC SURGERY | Facility: CLINIC | Age: 50
End: 2025-08-11
Payer: OTHER MISCELLANEOUS

## 2025-08-11 VITALS
DIASTOLIC BLOOD PRESSURE: 85 MMHG | BODY MASS INDEX: 49.44 KG/M2 | HEIGHT: 67 IN | WEIGHT: 315 LBS | SYSTOLIC BLOOD PRESSURE: 146 MMHG | HEART RATE: 72 BPM

## 2025-08-11 DIAGNOSIS — M19.011 DEGENERATIVE JOINT DISEASE OF RIGHT ACROMIOCLAVICULAR JOINT: ICD-10-CM

## 2025-08-11 DIAGNOSIS — M75.01 ADHESIVE CAPSULITIS OF RIGHT SHOULDER: Primary | ICD-10-CM

## 2025-08-11 DIAGNOSIS — M75.121 COMPLETE TEAR OF RIGHT ROTATOR CUFF, UNSPECIFIED WHETHER TRAUMATIC: ICD-10-CM

## 2025-08-11 PROCEDURE — 99213 OFFICE O/P EST LOW 20 MIN: CPT | Performed by: NURSE PRACTITIONER

## 2025-08-11 PROCEDURE — 20610 DRAIN/INJ JOINT/BURSA W/O US: CPT | Performed by: NURSE PRACTITIONER

## 2025-08-11 RX ORDER — LIDOCAINE HYDROCHLORIDE 10 MG/ML
4 INJECTION, SOLUTION EPIDURAL; INFILTRATION; INTRACAUDAL; PERINEURAL
Status: COMPLETED | OUTPATIENT
Start: 2025-08-11 | End: 2025-08-11

## 2025-08-11 RX ORDER — TRIAMCINOLONE ACETONIDE 40 MG/ML
80 INJECTION, SUSPENSION INTRA-ARTICULAR; INTRAMUSCULAR
Status: COMPLETED | OUTPATIENT
Start: 2025-08-11 | End: 2025-08-11

## 2025-08-11 RX ADMIN — LIDOCAINE HYDROCHLORIDE 4 ML: 10 INJECTION, SOLUTION EPIDURAL; INFILTRATION; INTRACAUDAL; PERINEURAL at 11:20

## 2025-08-11 RX ADMIN — TRIAMCINOLONE ACETONIDE 80 MG: 40 INJECTION, SUSPENSION INTRA-ARTICULAR; INTRAMUSCULAR at 11:20

## 2025-08-12 ENCOUNTER — PATIENT ROUNDING (BHMG ONLY) (OUTPATIENT)
Dept: ORTHOPEDIC SURGERY | Facility: CLINIC | Age: 50
End: 2025-08-12

## 2025-08-14 ENCOUNTER — HOSPITAL ENCOUNTER (OUTPATIENT)
Dept: PHYSICAL THERAPY | Facility: HOSPITAL | Age: 50
Setting detail: THERAPIES SERIES
Discharge: HOME | End: 2025-08-14
Payer: OTHER MISCELLANEOUS

## 2025-08-14 DIAGNOSIS — S46.011A TRAUMATIC COMPLETE TEAR OF RIGHT ROTATOR CUFF, INITIAL ENCOUNTER: ICD-10-CM

## 2025-08-14 DIAGNOSIS — M75.01 ADHESIVE CAPSULITIS OF RIGHT SHOULDER: Primary | ICD-10-CM

## 2025-08-14 PROCEDURE — 97161 PT EVAL LOW COMPLEX 20 MIN: CPT

## 2025-08-21 ENCOUNTER — HOSPITAL ENCOUNTER (OUTPATIENT)
Dept: PHYSICAL THERAPY | Facility: HOSPITAL | Age: 50
Setting detail: THERAPIES SERIES
Discharge: HOME | End: 2025-08-21
Payer: OTHER MISCELLANEOUS

## 2025-08-21 DIAGNOSIS — S46.011A TRAUMATIC COMPLETE TEAR OF RIGHT ROTATOR CUFF, INITIAL ENCOUNTER: ICD-10-CM

## 2025-08-21 DIAGNOSIS — M75.01 ADHESIVE CAPSULITIS OF RIGHT SHOULDER: Primary | ICD-10-CM

## 2025-08-21 PROCEDURE — 97110 THERAPEUTIC EXERCISES: CPT

## 2025-08-29 ENCOUNTER — HOSPITAL ENCOUNTER (OUTPATIENT)
Dept: PHYSICAL THERAPY | Facility: HOSPITAL | Age: 50
Setting detail: THERAPIES SERIES
Discharge: HOME | End: 2025-08-29
Payer: OTHER MISCELLANEOUS

## 2025-08-29 DIAGNOSIS — S46.011A TRAUMATIC COMPLETE TEAR OF RIGHT ROTATOR CUFF, INITIAL ENCOUNTER: ICD-10-CM

## 2025-08-29 DIAGNOSIS — M75.01 ADHESIVE CAPSULITIS OF RIGHT SHOULDER: Primary | ICD-10-CM

## 2025-08-29 PROCEDURE — 97110 THERAPEUTIC EXERCISES: CPT
